# Patient Record
Sex: MALE | NOT HISPANIC OR LATINO | Employment: FULL TIME | URBAN - METROPOLITAN AREA
[De-identification: names, ages, dates, MRNs, and addresses within clinical notes are randomized per-mention and may not be internally consistent; named-entity substitution may affect disease eponyms.]

---

## 2017-11-30 DIAGNOSIS — E78.00 PURE HYPERCHOLESTEROLEMIA: ICD-10-CM

## 2017-11-30 DIAGNOSIS — I10 ESSENTIAL HYPERTENSION: ICD-10-CM

## 2017-12-01 RX ORDER — LISINOPRIL 20 MG/1
20 TABLET ORAL DAILY
Qty: 30 TAB | Refills: 0 | Status: SHIPPED | OUTPATIENT
Start: 2017-12-01 | End: 2017-12-29 | Stop reason: SDUPTHER

## 2017-12-01 RX ORDER — SIMVASTATIN 40 MG
40 TABLET ORAL EVERY EVENING
Qty: 30 TAB | Refills: 0 | Status: SHIPPED | OUTPATIENT
Start: 2017-12-01 | End: 2017-12-29 | Stop reason: SDUPTHER

## 2017-12-29 ENCOUNTER — OFFICE VISIT (OUTPATIENT)
Dept: CARDIOLOGY | Facility: MEDICAL CENTER | Age: 57
End: 2017-12-29
Payer: COMMERCIAL

## 2017-12-29 VITALS
WEIGHT: 217 LBS | OXYGEN SATURATION: 96 % | DIASTOLIC BLOOD PRESSURE: 96 MMHG | HEIGHT: 70 IN | SYSTOLIC BLOOD PRESSURE: 162 MMHG | BODY MASS INDEX: 31.07 KG/M2 | HEART RATE: 74 BPM

## 2017-12-29 DIAGNOSIS — E78.00 PURE HYPERCHOLESTEROLEMIA: ICD-10-CM

## 2017-12-29 DIAGNOSIS — I10 ESSENTIAL HYPERTENSION: ICD-10-CM

## 2017-12-29 PROCEDURE — 99213 OFFICE O/P EST LOW 20 MIN: CPT | Performed by: INTERNAL MEDICINE

## 2017-12-29 RX ORDER — LISINOPRIL 20 MG/1
20 TABLET ORAL DAILY
Qty: 90 TAB | Refills: 3 | Status: SHIPPED | OUTPATIENT
Start: 2017-12-29 | End: 2019-02-25 | Stop reason: SDUPTHER

## 2017-12-29 RX ORDER — SIMVASTATIN 40 MG
40 TABLET ORAL EVERY EVENING
Qty: 90 TAB | Refills: 3 | Status: SHIPPED | OUTPATIENT
Start: 2017-12-29

## 2017-12-29 ASSESSMENT — ENCOUNTER SYMPTOMS
EYE DISCHARGE: 0
NAUSEA: 0
FEVER: 0
HEARTBURN: 0
DEPRESSION: 0
BLURRED VISION: 0
DIZZINESS: 0
HEADACHES: 0
BRUISES/BLEEDS EASILY: 0
PND: 0
MYALGIAS: 0
NERVOUS/ANXIOUS: 0
COUGH: 0
PALPITATIONS: 0
CHILLS: 0
SHORTNESS OF BREATH: 0

## 2017-12-29 NOTE — LETTER
[facility]   [address]   [address]  [place] [address]  Phone: [phone]  Fax: [phone]              Benito Davis  1960    Encounter Date: 12/29/2017    Eduardo Rosado M.D.          PROGRESS NOTE:  Subjective:   Benito Davis is a 57 y.o. male who presents today In annual follow-up for hypertension and hyperlipidemia.  He's being evaluated for sleep apnea currently  He does not have daytime somnolence.  Home blood pressures are usually elevated initially. He waits a few minutes and all blood pressures are 130 systolic or less.  His last lipids were checked and he gave me the values which I did not record but they were satisfactory.  No other new issues    Past Medical History:   Diagnosis Date   • Heart burn    • Hypertension    • Indigestion    • Renal disorder     stones     Past Surgical History:   Procedure Laterality Date   • CYSTOSCOPY  10/2/2015    Procedure: CYSTOSCOPY ;  Surgeon: Zaki Fitzgerald M.D.;  Location: [facility];  Service:    • URETEROSCOPY Right 10/2/2015    Procedure: URETEROSCOPY;  Surgeon: Zaki Fitzgerald M.D.;  Location: [facility];  Service:    • LASERTRIPSY  10/2/2015    Procedure:  LASER LITHOTRIPSY;  Surgeon: Zaki Fitzgerald M.D.;  Location: [facility];  Service:    • STENT PLACEMENT Left 10/2/2015    Procedure: STENT PLACEMENT;  Surgeon: Zaki Fitzgerald M.D.;  Location: [facility];  Service:    • OTHER  2012    ganglion cyst removal Left wrist   • OTHER ABDOMINAL SURGERY  1990    appendectomy     History reviewed. No pertinent family history.  History   Smoking Status   • Former Smoker   • Packs/day: 0.50   • Years: 4.00   • Types: Cigarettes   • Quit date: 10/20/1983   Smokeless Tobacco   • Never Used     Allergies   Allergen Reactions   • Codeine Rash     Outpatient Encounter Prescriptions as of 12/29/2017   Medication Sig Dispense  "Refill   • simvastatin (ZOCOR) 40 MG Tab Take 1 Tab by mouth every evening. 90 Tab 3   • lisinopril (PRINIVIL) 20 MG Tab Take 1 Tab by mouth every day. 90 Tab 3   • tamsulosin (FLOMAX) 0.4 MG capsule Take 0.4 mg by mouth ONE-HALF HOUR AFTER BREAKFAST.     • aspirin EC (ECOTRIN) 81 MG Tablet Delayed Response Take 81 mg by mouth every day.     • ranitidine (ZANTAC) 75 MG tablet Take 75 mg by mouth every evening.     • [DISCONTINUED] simvastatin (ZOCOR) 40 MG Tab Take 1 Tab by mouth every evening. Needs to be seen for further refills. Thank you 30 Tab 0   • [DISCONTINUED] lisinopril (PRINIVIL) 20 MG Tab Take 1 Tab by mouth every day. Needs to be seen for further refills. Thank you 30 Tab 0   • [DISCONTINUED] hydrocodone-acetaminophen (NORCO) 5-325 MG Tab per tablet Take 1-2 Tabs by mouth every four hours as needed. (Patient not taking: Reported on 12/29/2017) 20 Tab 0   • [DISCONTINUED] oxycodone-acetaminophen (PERCOCET) 5-325 MG Tab Take 1-2 Tabs by mouth every four hours as needed.     • [DISCONTINUED] GARLIC PO Take 1,000 mg by mouth every day.       No facility-administered encounter medications on file as of 12/29/2017.      Review of Systems   Constitutional: Negative for chills, fever and malaise/fatigue.   Eyes: Negative for blurred vision and discharge.   Respiratory: Negative for cough and shortness of breath.    Cardiovascular: Negative for chest pain, palpitations, leg swelling and PND.   Gastrointestinal: Negative for heartburn and nausea.   Genitourinary: Negative for dysuria and urgency.   Musculoskeletal: Positive for joint pain. Negative for myalgias.   Skin: Negative for itching and rash.   Neurological: Negative for dizziness and headaches.   Endo/Heme/Allergies: Negative for environmental allergies. Does not bruise/bleed easily.   Psychiatric/Behavioral: Negative for depression. The patient is not nervous/anxious.         Objective:   BP (!) 162/96   Pulse 74   Ht 1.778 m (5' 10\")   Wt 98.4 kg " (217 lb)   SpO2 96%   BMI 31.14 kg/m²      Physical Exam   Constitutional: He is oriented to person, place, and time. He appears well-developed and well-nourished.   HENT:   Head: Normocephalic and atraumatic.   Eyes: Conjunctivae and EOM are normal. No scleral icterus.   Neck: Neck supple. No JVD present. No thyromegaly present.   Cardiovascular: Normal rate, regular rhythm and normal heart sounds.  Exam reveals no gallop and no friction rub.    No murmur heard.  Pulmonary/Chest: Effort normal and breath sounds normal. No respiratory distress. He has no wheezes. He has no rales. He exhibits no tenderness.   Abdominal: Soft. Bowel sounds are normal. He exhibits no distension and no mass. There is no tenderness.   Neurological: He is alert and oriented to person, place, and time. Coordination normal.   Skin: Skin is warm and dry. No rash noted. No pallor.   Psychiatric: He has a normal mood and affect. His behavior is normal. Judgment and thought content normal.       Assessment:     1. Pure hypercholesterolemia  simvastatin (ZOCOR) 40 MG Tab   2. Essential hypertension  lisinopril (PRINIVIL) 20 MG Tab       Medical Decision Making:  Today's Assessment / Status / Plan:   Clinical status stable.  White count syndrome documented.  Blood pressure high normal with current meds.  No change in meds.  Return annually      Tata Charles A.P.R.N.  [address] [facility] [address]  VIA Facsimile: [phone]

## 2017-12-30 NOTE — PROGRESS NOTES
Subjective:   Benito Davis is a 57 y.o. male who presents today In annual follow-up for hypertension and hyperlipidemia.  He's being evaluated for sleep apnea currently  He does not have daytime somnolence.  Home blood pressures are usually elevated initially. He waits a few minutes and all blood pressures are 130 systolic or less.  His last lipids were checked and he gave me the values which I did not record but they were satisfactory.  No other new issues    Past Medical History:   Diagnosis Date   • Heart burn    • Hypertension    • Indigestion    • Renal disorder     stones     Past Surgical History:   Procedure Laterality Date   • CYSTOSCOPY  10/2/2015    Procedure: CYSTOSCOPY ;  Surgeon: Zaki Fitzgerald M.D.;  Location: [facility];  Service:    • URETEROSCOPY Right 10/2/2015    Procedure: URETEROSCOPY;  Surgeon: Zaki Fitzgerald M.D.;  Location: [facility];  Service:    • LASERTRIPSY  10/2/2015    Procedure:  LASER LITHOTRIPSY;  Surgeon: Zaki Fitzgerald M.D.;  Location: SUR[facility]ERY [facility];  Service:    • STENT PLACEMENT Left 10/2/2015    Procedure: STENT PLACEMENT;  Surgeon: Zaki Fitzgerald M.D.;  Location: SUR[facility]ERY [facility];  Service:    • OTHER  2012    ganglion cyst removal Left wrist   • OTHER ABDOMINAL SURGERY  1990    appendectomy     History reviewed. No pertinent family history.  History   Smoking Status   • Former Smoker   • Packs/day: 0.50   • Years: 4.00   • Types: Cigarettes   • Quit date: 10/20/1983   Smokeless Tobacco   • Never Used     Allergies   Allergen Reactions   • Codeine Rash     Outpatient Encounter Prescriptions as of 12/29/2017   Medication Sig Dispense Refill   • simvastatin (ZOCOR) 40 MG Tab Take 1 Tab by mouth every evening. 90 Tab 3   • lisinopril (PRINIVIL) 20 MG Tab Take 1 Tab by mouth every day. 90 Tab 3   • tamsulosin (FLOMAX) 0.4 MG capsule Take 0.4 mg by mouth ONE-HALF HOUR AFTER BREAKFAST.     • aspirin EC (ECOTRIN) 81 MG Tablet  "Delayed Response Take 81 mg by mouth every day.     • ranitidine (ZANTAC) 75 MG tablet Take 75 mg by mouth every evening.     • [DISCONTINUED] simvastatin (ZOCOR) 40 MG Tab Take 1 Tab by mouth every evening. Needs to be seen for further refills. Thank you 30 Tab 0   • [DISCONTINUED] lisinopril (PRINIVIL) 20 MG Tab Take 1 Tab by mouth every day. Needs to be seen for further refills. Thank you 30 Tab 0   • [DISCONTINUED] hydrocodone-acetaminophen (NORCO) 5-325 MG Tab per tablet Take 1-2 Tabs by mouth every four hours as needed. (Patient not taking: Reported on 12/29/2017) 20 Tab 0   • [DISCONTINUED] oxycodone-acetaminophen (PERCOCET) 5-325 MG Tab Take 1-2 Tabs by mouth every four hours as needed.     • [DISCONTINUED] GARLIC PO Take 1,000 mg by mouth every day.       No facility-administered encounter medications on file as of 12/29/2017.      Review of Systems   Constitutional: Negative for chills, fever and malaise/fatigue.   Eyes: Negative for blurred vision and discharge.   Respiratory: Negative for cough and shortness of breath.    Cardiovascular: Negative for chest pain, palpitations, leg swelling and PND.   Gastrointestinal: Negative for heartburn and nausea.   Genitourinary: Negative for dysuria and urgency.   Musculoskeletal: Positive for joint pain. Negative for myalgias.   Skin: Negative for itching and rash.   Neurological: Negative for dizziness and headaches.   Endo/Heme/Allergies: Negative for environmental allergies. Does not bruise/bleed easily.   Psychiatric/Behavioral: Negative for depression. The patient is not nervous/anxious.         Objective:   BP (!) 162/96   Pulse 74   Ht 1.778 m (5' 10\")   Wt 98.4 kg (217 lb)   SpO2 96%   BMI 31.14 kg/m²     Physical Exam   Constitutional: He is oriented to person, place, and time. He appears well-developed and well-nourished.   HENT:   Head: Normocephalic and atraumatic.   Eyes: Conjunctivae and EOM are normal. No scleral icterus.   Neck: Neck supple. " No JVD present. No thyromegaly present.   Cardiovascular: Normal rate, regular rhythm and normal heart sounds.  Exam reveals no gallop and no friction rub.    No murmur heard.  Pulmonary/Chest: Effort normal and breath sounds normal. No respiratory distress. He has no wheezes. He has no rales. He exhibits no tenderness.   Abdominal: Soft. Bowel sounds are normal. He exhibits no distension and no mass. There is no tenderness.   Neurological: He is alert and oriented to person, place, and time. Coordination normal.   Skin: Skin is warm and dry. No rash noted. No pallor.   Psychiatric: He has a normal mood and affect. His behavior is normal. Judgment and thought content normal.       Assessment:     1. Pure hypercholesterolemia  simvastatin (ZOCOR) 40 MG Tab   2. Essential hypertension  lisinopril (PRINIVIL) 20 MG Tab       Medical Decision Making:  Today's Assessment / Status / Plan:   Clinical status stable.  White count syndrome documented.  Blood pressure high normal with current meds.  No change in meds.  Return annually

## 2018-01-19 ENCOUNTER — HOSPITAL ENCOUNTER (OUTPATIENT)
Dept: HOSPITAL 8 - CFH | Age: 58
Discharge: HOME | End: 2018-01-19
Attending: NURSE PRACTITIONER
Payer: COMMERCIAL

## 2018-01-19 DIAGNOSIS — I10: ICD-10-CM

## 2018-01-19 DIAGNOSIS — I07.1: Primary | ICD-10-CM

## 2018-01-19 DIAGNOSIS — E78.5: ICD-10-CM

## 2018-01-19 DIAGNOSIS — Z82.49: ICD-10-CM

## 2018-01-19 PROCEDURE — 93306 TTE W/DOPPLER COMPLETE: CPT

## 2018-07-26 ENCOUNTER — OFFICE VISIT (OUTPATIENT)
Dept: CARDIOLOGY | Facility: MEDICAL CENTER | Age: 58
End: 2018-07-26
Payer: COMMERCIAL

## 2018-07-26 VITALS
HEIGHT: 70 IN | WEIGHT: 216 LBS | RESPIRATION RATE: 14 BRPM | SYSTOLIC BLOOD PRESSURE: 140 MMHG | OXYGEN SATURATION: 96 % | DIASTOLIC BLOOD PRESSURE: 90 MMHG | BODY MASS INDEX: 30.92 KG/M2 | HEART RATE: 64 BPM

## 2018-07-26 DIAGNOSIS — R94.6 ABNORMAL THYROID FUNCTION TEST: ICD-10-CM

## 2018-07-26 DIAGNOSIS — R00.1 BRADYCARDIA: ICD-10-CM

## 2018-07-26 DIAGNOSIS — I10 ESSENTIAL HYPERTENSION: ICD-10-CM

## 2018-07-26 DIAGNOSIS — E78.5 DYSLIPIDEMIA: ICD-10-CM

## 2018-07-26 PROCEDURE — 99204 OFFICE O/P NEW MOD 45 MIN: CPT | Performed by: INTERNAL MEDICINE

## 2018-07-26 ASSESSMENT — ENCOUNTER SYMPTOMS
PALPITATIONS: 0
HEARTBURN: 1
PND: 0
CHILLS: 0
SHORTNESS OF BREATH: 0
FEVER: 0
ABDOMINAL PAIN: 0
DIZZINESS: 0
LOSS OF CONSCIOUSNESS: 0
BLURRED VISION: 0
ORTHOPNEA: 0
MYALGIAS: 0
INSOMNIA: 0

## 2018-07-26 NOTE — LETTER
[facility]   [address] [place] [address]  [place] [address]  Phone: [phone]  Fax: [phone]              Benito Davis  1960    Encounter Date: 2018    Merlin Gomez M.D.          PROGRESS NOTE:  Chief Complaint   Patient presents with   •  Bradycardia       Subjective:   Benito Davis is a 58 y.o. male who presents today referred by his PCP Tata HERRERA for evaluation of bradycardia.    The patient has significant past medical history of hypertension, hyperlipidemia and family history of premature heart disease.    The patient has been evaluated by [facility].  The patient was informed by his girlfriend that he was a snorer and had restless sleep.  He was seen by his PCP who recommended a sleep study.  In  the patient underwent a sleep study evaluation at which time he was noted to have nocturnal hypoxemia with oxygen saturations in the 80s in addition to sinus bradycardia with rates of 31-59.  Apparently there was no evidence of obstructive sleep apnea.  Pulmonary function tests were normal.  He was only just started on nocturnal oxygen supplements 3 days ago.  He also had an echocardiogram at [facility] that showed normal left ventricular ejection fraction of 65% with mild pulmonary hypertension with pressure of 39 mmHg.  The patient is yet to see a pulmonologist at [facility].    The patient has no cardiac symptoms.  He exercises regularly without any symptoms of chest pain or shortness of breath.  The patient has no symptoms of lightheadedness, palpitations or syncope.    The patient had previously been seen by my partner Dr. Eduardo Rosado in  and underwent a stress echocardiogram that was normal.    The patient has been on chronic aspirin therapy.  Family history significant that his father  suddenly of a heart attack at age 49 and had a half brother who had CABG at age  46.    Social history surgical tech at [employer].  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    Past Medical History:   Diagnosis Date   • Heart burn    • Hypertension    • Indigestion    • Renal disorder     stones     Past Surgical History:   Procedure Laterality Date   • CYSTOSCOPY  10/2/2015    Procedure: CYSTOSCOPY ;  Surgeon: Zaki Fitzgerald M.D.;  Location: SURGERY [facility];  Service:    • URETEROSCOPY Right 10/2/2015    Procedure: URETEROSCOPY;  Surgeon: Zaki Fitzgerald M.D.;  Location: SURGERY [facility];  Service:    • LASERTRIPSY  10/2/2015    Procedure:  LASER LITHOTRIPSY;  Surgeon: Zaki Fitzgerald M.D.;  Location: SUR[facility]ERY [facility];  Service:    • STENT PLACEMENT Left 10/2/2015    Procedure: STENT PLACEMENT;  Surgeon: Zaki Fitzgerald M.D.;  Location: SURGERY [facility];  Service:    • OTHER  2012    ganglion cyst removal Left wrist   • OTHER ABDOMINAL SURGERY  1990    appendectomy     History reviewed. No pertinent family history.  Social History     Social History   • Marital status: Single     Spouse name: N/A   • Number of children: N/A   • Years of education: N/A     Occupational History   • Not on file.     Social History Main Topics   • Smoking status: Former Smoker     Packs/day: 0.50     Years: 4.00     Types: Cigarettes     Quit date: 10/20/1983   • Smokeless tobacco: Never Used   • Alcohol use Yes      Comment: six pack a month   • Drug use: No   • Sexual activity: Not on file     Other Topics Concern   • Not on file     Social History Narrative   • No narrative on file     Allergies   Allergen Reactions   • Codeine Rash     Outpatient Encounter Prescriptions as of 7/26/2018   Medication Sig Dispense Refill   • simvastatin (ZOCOR) 40 MG Tab Take 1 Tab by mouth every evening. 90 Tab 3   • lisinopril (PRINIVIL) 20 MG Tab Take 1 Tab by mouth every day. 90 Tab 3   • tamsulosin (FLOMAX) 0.4 MG capsule Take 0.4 mg by mouth as needed.     • aspirin EC (ECOTRIN) 81 MG Tablet  "Delayed Response Take 81 mg by mouth every day.     • ranitidine (ZANTAC) 75 MG tablet Take 35 mg by mouth every evening.       No facility-administered encounter medications on file as of 7/26/2018.      Review of Systems   Constitutional: Negative for chills and fever.   HENT: Positive for tinnitus. Negative for congestion.    Eyes: Negative for blurred vision.   Respiratory: Negative for shortness of breath.    Cardiovascular: Negative for chest pain, palpitations, orthopnea, leg swelling and PND.   Gastrointestinal: Positive for heartburn. Negative for abdominal pain.   Genitourinary: Negative for dysuria.   Musculoskeletal: Negative for joint pain and myalgias.   Skin: Negative for rash.   Neurological: Negative for dizziness and loss of consciousness.   Psychiatric/Behavioral: The patient does not have insomnia.         Objective:   /90   Pulse 64   Resp 14   Ht 1.778 m (5' 10\")   Wt 98 kg (216 lb)   SpO2 96%   BMI 30.99 kg/m²      Physical Exam   Constitutional: He is oriented to person, place, and time. He appears well-developed and well-nourished.   HENT:   Head: Normocephalic and atraumatic.   Eyes: Pupils are equal, round, and reactive to light. EOM are normal. No scleral icterus.   Neck: Neck supple. No JVD present. No thyromegaly present.   Cardiovascular: Normal rate, regular rhythm, normal heart sounds and intact distal pulses.  Exam reveals no gallop and no friction rub.    No murmur heard.  Pulmonary/Chest: Effort normal and breath sounds normal. No respiratory distress. He has no wheezes. He has no rales.   Abdominal: Soft. Bowel sounds are normal. He exhibits no mass. There is no tenderness.   Musculoskeletal: Normal range of motion. He exhibits no edema or deformity.   Lymphadenopathy:     He has no cervical adenopathy.   Neurological: He is alert and oriented to person, place, and time. No cranial nerve deficit. He exhibits normal muscle tone.   Skin: Skin is warm and dry.   "   Psychiatric: He has a normal mood and affect. His behavior is normal.     All of his recent diagnostic tests including his echocardiogram previous EKG from 2015 and stress echocardiogram were reviewed by myself.    STRESS ECHOCARDIOGRAM 10/30/2014  Excellent exercise tolerance.  LVH on baseline echo.  Hypertensive BP response at peak exercise.  Negative for ischemia by EKG and Echo.    Assessment:     1. Bradycardia     2. Dyslipidemia  CT-CARDIAC SCORING   3. Essential hypertension         Medical Decision Making:  Today's Assessment / Status / Plan:     1.  I reviewed with patient his current cardiac condition.  2.  I indicated that nocturnal bradycardia particularly associated with hypoxemia as normal and that he is not a candidate for pacemaker.  3.  I think his risk for coronary disease should be further evaluated with a CT coronary calcification scan to see if he requires more intense lipid-lowering regimen.  4.  I will make further recommendations based on the above evaluation.  5.  Will await further recommendation from [facility] pulmonary group.    Thank you for allowing me see this gentleman in consultation      ALEXANDRIA ChuRRadhaN.  [address] [facility] [address]  VIA Facsimile: [phone]

## 2018-07-26 NOTE — PROGRESS NOTES
Chief Complaint   Patient presents with   •  Bradycardia       Subjective:   Benito Davis is a 58 y.o. male who presents today referred by his PCP Tata HERRERA for evaluation of bradycardia.    The patient has significant past medical history of hypertension, hyperlipidemia and family history of premature heart disease.    The patient has been evaluated by [facility].  The patient was informed by his girlfriend that he was a snorer and had restless sleep.  He was seen by his PCP who recommended a sleep study.  In  the patient underwent a sleep study evaluation at which time he was noted to have nocturnal hypoxemia with oxygen saturations in the 80s in addition to sinus bradycardia with rates of 31-59.  Apparently there was no evidence of obstructive sleep apnea.  Pulmonary function tests were normal.  He was only just started on nocturnal oxygen supplements 3 days ago.  He also had an echocardiogram at [facility] that showed normal left ventricular ejection fraction of 65% with mild pulmonary hypertension with pressure of 39 mmHg.  The patient is yet to see a pulmonologist at [facility].    The patient has no cardiac symptoms.  He exercises regularly without any symptoms of chest pain or shortness of breath.  The patient has no symptoms of lightheadedness, palpitations or syncope.    The patient had previously been seen by my partner Dr. Eduardo Rosado in  and underwent a stress echocardiogram that was normal.    The patient has been on chronic aspirin therapy.  Family history significant that his father  suddenly of a heart attack at age 49 and had a half brother who had CABG at age 46.    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    Past Medical History:   Diagnosis Date   • Heart burn    • Hypertension    • Indigestion    • Renal disorder     stones     Past Surgical History:   Procedure Laterality Date    • CYSTOSCOPY  10/2/2015    Procedure: CYSTOSCOPY ;  Surgeon: Zaki Fitzgerald M.D.;  Location: SURGERY [facility];  Service:    • URETEROSCOPY Right 10/2/2015    Procedure: URETEROSCOPY;  Surgeon: Zaki Fitzgerald M.D.;  Location: SURGERY [facility];  Service:    • LASERTRIPSY  10/2/2015    Procedure:  LASER LITHOTRIPSY;  Surgeon: Zaki Fitzgerald M.D.;  Location: SURGERY [facility];  Service:    • STENT PLACEMENT Left 10/2/2015    Procedure: STENT PLACEMENT;  Surgeon: Zaki Fitzgerald M.D.;  Location: SURGERY [facility];  Service:    • OTHER  2012    ganglion cyst removal Left wrist   • OTHER ABDOMINAL SURGERY  1990    appendectomy     History reviewed. No pertinent family history.  Social History     Social History   • Marital status: Single     Spouse name: N/A   • Number of children: N/A   • Years of education: N/A     Occupational History   • Not on file.     Social History Main Topics   • Smoking status: Former Smoker     Packs/day: 0.50     Years: 4.00     Types: Cigarettes     Quit date: 10/20/1983   • Smokeless tobacco: Never Used   • Alcohol use Yes      Comment: six pack a month   • Drug use: No   • Sexual activity: Not on file     Other Topics Concern   • Not on file     Social History Narrative   • No narrative on file     Allergies   Allergen Reactions   • Codeine Rash     Outpatient Encounter Prescriptions as of 7/26/2018   Medication Sig Dispense Refill   • simvastatin (ZOCOR) 40 MG Tab Take 1 Tab by mouth every evening. 90 Tab 3   • lisinopril (PRINIVIL) 20 MG Tab Take 1 Tab by mouth every day. 90 Tab 3   • tamsulosin (FLOMAX) 0.4 MG capsule Take 0.4 mg by mouth as needed.     • aspirin EC (ECOTRIN) 81 MG Tablet Delayed Response Take 81 mg by mouth every day.     • ranitidine (ZANTAC) 75 MG tablet Take 35 mg by mouth every evening.       No facility-administered encounter medications on file as of 7/26/2018.      Review of Systems   Constitutional: Negative for chills and fever.  "  HENT: Positive for tinnitus. Negative for congestion.    Eyes: Negative for blurred vision.   Respiratory: Negative for shortness of breath.    Cardiovascular: Negative for chest pain, palpitations, orthopnea, leg swelling and PND.   Gastrointestinal: Positive for heartburn. Negative for abdominal pain.   Genitourinary: Negative for dysuria.   Musculoskeletal: Negative for joint pain and myalgias.   Skin: Negative for rash.   Neurological: Negative for dizziness and loss of consciousness.   Psychiatric/Behavioral: The patient does not have insomnia.         Objective:   /90   Pulse 64   Resp 14   Ht 1.778 m (5' 10\")   Wt 98 kg (216 lb)   SpO2 96%   BMI 30.99 kg/m²     Physical Exam   Constitutional: He is oriented to person, place, and time. He appears well-developed and well-nourished.   HENT:   Head: Normocephalic and atraumatic.   Eyes: Pupils are equal, round, and reactive to light. EOM are normal. No scleral icterus.   Neck: Neck supple. No JVD present. No thyromegaly present.   Cardiovascular: Normal rate, regular rhythm, normal heart sounds and intact distal pulses.  Exam reveals no gallop and no friction rub.    No murmur heard.  Pulmonary/Chest: Effort normal and breath sounds normal. No respiratory distress. He has no wheezes. He has no rales.   Abdominal: Soft. Bowel sounds are normal. He exhibits no mass. There is no tenderness.   Musculoskeletal: Normal range of motion. He exhibits no edema or deformity.   Lymphadenopathy:     He has no cervical adenopathy.   Neurological: He is alert and oriented to person, place, and time. No cranial nerve deficit. He exhibits normal muscle tone.   Skin: Skin is warm and dry.   Psychiatric: He has a normal mood and affect. His behavior is normal.     All of his recent diagnostic tests including his echocardiogram previous EKG from 2015 and stress echocardiogram were reviewed by myself.    Laboratory from 10/10/2017 showed abnormal thyroid function tests " with low TSH and elevated T4.    Blood pressure log was reviewed which showed normal blood pressure measurements.    STRESS ECHOCARDIOGRAM 10/30/2014  Excellent exercise tolerance.  LVH on baseline echo.  Hypertensive BP response at peak exercise.  Negative for ischemia by EKG and Echo.    Assessment:     1. Bradycardia     2. Dyslipidemia  CT-CARDIAC SCORING   3. Essential hypertension     4. Abnormal thyroid function test       Medical Decision Making:  Today's Assessment / Status / Plan:     1.  I reviewed with patient his current cardiac condition.  2.  I indicated that nocturnal bradycardia particularly associated with hypoxemia as normal and that he is not a candidate for pacemaker.  3.  I think his risk for coronary disease should be further evaluated with a CT coronary calcification scan to see if he requires more intense lipid-lowering regimen.  4.  I will make further recommendations based on the above evaluation.  5.  The patient will follow up with his PCP about his abnormal thyroid function tests which I reviewed with him.  6.  Will await further recommendation from [facility] pulmonary group.    Thank you for allowing me see this gentleman in consultation

## 2018-07-26 NOTE — LETTER
[facility]   [address] [place] [address]  TY[place] [address]  Phone: [phone]  Fax: [phone]              Benito Davis  1960    Encounter Date: 2018    Merlin Gomez M.D.          PROGRESS NOTE:  Chief Complaint   Patient presents with   •  Bradycardia       Subjective:   Benito Davis is a 58 y.o. male who presents today referred by his PCP Tata HERRERA for evaluation of bradycardia.    The patient has significant past medical history of hypertension, hyperlipidemia and family history of premature heart disease.    The patient has been evaluated by [facility].  The patient was informed by his girlfriend that he was a snorer and had restless sleep.  He was seen by his PCP who recommended a sleep study.  In  the patient underwent a sleep study evaluation at which time he was noted to have nocturnal hypoxemia with oxygen saturations in the 80s in addition to sinus bradycardia with rates of 31-59.  Apparently there was no evidence of obstructive sleep apnea.  Pulmonary function tests were normal.  He was only just started on nocturnal oxygen supplements 3 days ago.  He also had an echocardiogram at [facility] that showed normal left ventricular ejection fraction of 65% with mild pulmonary hypertension with pressure of 39 mmHg.  The patient is yet to see a pulmonologist at [facility].    The patient has no cardiac symptoms.  He exercises regularly without any symptoms of chest pain or shortness of breath.  The patient has no symptoms of lightheadedness, palpitations or syncope.    The patient had previously been seen by my partner Dr. Eduardo Rosado in  and underwent a stress echocardiogram that was normal.    The patient has been on chronic aspirin therapy.  Family history significant that his father  suddenly of a heart attack at age 49 and had a half brother who had CABG at age  46.    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    Past Medical History:   Diagnosis Date   • Heart burn    • Hypertension    • Indigestion    • Renal disorder     stones     Past Surgical History:   Procedure Laterality Date   • CYSTOSCOPY  10/2/2015    Procedure: CYSTOSCOPY ;  Surgeon: Zaki Fitzgerald M.D.;  Location: SURGERY [facility];  Service:    • URETEROSCOPY Right 10/2/2015    Procedure: URETEROSCOPY;  Surgeon: Zaki Fitzgerald M.D.;  Location: SURGERY [facility];  Service:    • LASERTRIPSY  10/2/2015    Procedure:  LASER LITHOTRIPSY;  Surgeon: Zaki Fitzgerald M.D.;  Location: SURGERY [facility];  Service:    • STENT PLACEMENT Left 10/2/2015    Procedure: STENT PLACEMENT;  Surgeon: Zaki Fitzgerald M.D.;  Location: SURGERY [facility];  Service:    • OTHER  2012    ganglion cyst removal Left wrist   • OTHER ABDOMINAL SURGERY  1990    appendectomy     History reviewed. No pertinent family history.  Social History     Social History   • Marital status: Single     Spouse name: N/A   • Number of children: N/A   • Years of education: N/A     Occupational History   • Not on file.     Social History Main Topics   • Smoking status: Former Smoker     Packs/day: 0.50     Years: 4.00     Types: Cigarettes     Quit date: 10/20/1983   • Smokeless tobacco: Never Used   • Alcohol use Yes      Comment: six pack a month   • Drug use: No   • Sexual activity: Not on file     Other Topics Concern   • Not on file     Social History Narrative   • No narrative on file     Allergies   Allergen Reactions   • Codeine Rash     Outpatient Encounter Prescriptions as of 7/26/2018   Medication Sig Dispense Refill   • simvastatin (ZOCOR) 40 MG Tab Take 1 Tab by mouth every evening. 90 Tab 3   • lisinopril (PRINIVIL) 20 MG Tab Take 1 Tab by mouth every day. 90 Tab 3   • tamsulosin (FLOMAX) 0.4 MG capsule Take 0.4 mg by mouth as needed.     • aspirin EC (ECOTRIN) 81 MG Tablet  "Delayed Response Take 81 mg by mouth every day.     • ranitidine (ZANTAC) 75 MG tablet Take 35 mg by mouth every evening.       No facility-administered encounter medications on file as of 7/26/2018.      Review of Systems   Constitutional: Negative for chills and fever.   HENT: Positive for tinnitus. Negative for congestion.    Eyes: Negative for blurred vision.   Respiratory: Negative for shortness of breath.    Cardiovascular: Negative for chest pain, palpitations, orthopnea, leg swelling and PND.   Gastrointestinal: Positive for heartburn. Negative for abdominal pain.   Genitourinary: Negative for dysuria.   Musculoskeletal: Negative for joint pain and myalgias.   Skin: Negative for rash.   Neurological: Negative for dizziness and loss of consciousness.   Psychiatric/Behavioral: The patient does not have insomnia.         Objective:   /90   Pulse 64   Resp 14   Ht 1.778 m (5' 10\")   Wt 98 kg (216 lb)   SpO2 96%   BMI 30.99 kg/m²      Physical Exam   Constitutional: He is oriented to person, place, and time. He appears well-developed and well-nourished.   HENT:   Head: Normocephalic and atraumatic.   Eyes: Pupils are equal, round, and reactive to light. EOM are normal. No scleral icterus.   Neck: Neck supple. No JVD present. No thyromegaly present.   Cardiovascular: Normal rate, regular rhythm, normal heart sounds and intact distal pulses.  Exam reveals no gallop and no friction rub.    No murmur heard.  Pulmonary/Chest: Effort normal and breath sounds normal. No respiratory distress. He has no wheezes. He has no rales.   Abdominal: Soft. Bowel sounds are normal. He exhibits no mass. There is no tenderness.   Musculoskeletal: Normal range of motion. He exhibits no edema or deformity.   Lymphadenopathy:     He has no cervical adenopathy.   Neurological: He is alert and oriented to person, place, and time. No cranial nerve deficit. He exhibits normal muscle tone.   Skin: Skin is warm and dry.   "   Psychiatric: He has a normal mood and affect. His behavior is normal.     All of his recent diagnostic tests including his echocardiogram previous EKG from 2015 and stress echocardiogram were reviewed by myself.    Laboratory from 10/10/2017 showed abnormal thyroid function tests with low TSH and elevated T4.    Blood pressure log was reviewed which showed normal blood pressure measurements.    STRESS ECHOCARDIOGRAM 10/30/2014  Excellent exercise tolerance.  LVH on baseline echo.  Hypertensive BP response at peak exercise.  Negative for ischemia by EKG and Echo.    Assessment:     1. Bradycardia     2. Dyslipidemia  CT-CARDIAC SCORING   3. Essential hypertension     4. Abnormal thyroid function test       Medical Decision Making:  Today's Assessment / Status / Plan:     1.  I reviewed with patient his current cardiac condition.  2.  I indicated that nocturnal bradycardia particularly associated with hypoxemia as normal and that he is not a candidate for pacemaker.  3.  I think his risk for coronary disease should be further evaluated with a CT coronary calcification scan to see if he requires more intense lipid-lowering regimen.  4.  I will make further recommendations based on the above evaluation.  5.  The patient will follow up with his PCP about his abnormal thyroid function tests which I reviewed with him.  6.  Will await further recommendation from [facility] pulmonary group.    Thank you for allowing me see this gentleman in consultation      ALEXANDRIA ChuR.N.  [id number] [facility] [address]  VIA Facsimile: [phone]

## 2018-07-26 NOTE — LETTER
[facility]   [address] [place] [address]  [place] [address]  Phone: [phone]  Fax: [phone]              Benito Davis  1960    Encounter Date: 2018    Merlin Gomez M.D.          PROGRESS NOTE:  Chief Complaint   Patient presents with   •  Bradycardia       Subjective:   Benito Davis is a 58 y.o. male who presents today referred by his PCP Tata HERRERA for evaluation of bradycardia.    The patient has significant past medical history of hypertension, hyperlipidemia and family history of premature heart disease.    The patient has been evaluated by [facility].  The patient was informed by his girlfriend that he was a snorer and had restless sleep.  He was seen by his PCP who recommended a sleep study.  In  the patient underwent a sleep study evaluation at which time he was noted to have nocturnal hypoxemia with oxygen saturations in the 80s in addition to sinus bradycardia with rates of 31-59.  Apparently there was no evidence of obstructive sleep apnea.  Pulmonary function tests were normal.  He was only just started on nocturnal oxygen supplements 3 days ago.  He also had an echocardiogram at [facility] that showed normal left ventricular ejection fraction of 65% with mild pulmonary hypertension with pressure of 39 mmHg.  The patient is yet to see a pulmonologist at [facility].    The patient has no cardiac symptoms.  He exercises regularly without any symptoms of chest pain or shortness of breath.  The patient has no symptoms of lightheadedness, palpitations or syncope.    The patient had previously been seen by my partner Dr. Eduardo Rosado in  and underwent a stress echocardiogram that was normal.    The patient has been on chronic aspirin therapy.  Family history significant that his father  suddenly of a heart attack at age 49 and had a half brother who had CABG at age  46.    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    Past Medical History:   Diagnosis Date   • Heart burn    • Hypertension    • Indigestion    • Renal disorder     stones     Past Surgical History:   Procedure Laterality Date   • CYSTOSCOPY  10/2/2015    Procedure: CYSTOSCOPY ;  Surgeon: Zaki Fitzgerald M.D.;  Location: SURGERY [facility];  Service:    • URETEROSCOPY Right 10/2/2015    Procedure: URETEROSCOPY;  Surgeon: Zaki Fitzgerald M.D.;  Location: SURGERY [facility];  Service:    • LASERTRIPSY  10/2/2015    Procedure:  LASER LITHOTRIPSY;  Surgeon: Zaki Fitzgerald M.D.;  Location: SURGERY [facility];  Service:    • STENT PLACEMENT Left 10/2/2015    Procedure: STENT PLACEMENT;  Surgeon: Zaki Fitzgerald M.D.;  Location: SURGERY [facility];  Service:    • OTHER  2012    ganglion cyst removal Left wrist   • OTHER ABDOMINAL SURGERY  1990    appendectomy     History reviewed. No pertinent family history.  Social History     Social History   • Marital status: Single     Spouse name: N/A   • Number of children: N/A   • Years of education: N/A     Occupational History   • Not on file.     Social History Main Topics   • Smoking status: Former Smoker     Packs/day: 0.50     Years: 4.00     Types: Cigarettes     Quit date: 10/20/1983   • Smokeless tobacco: Never Used   • Alcohol use Yes      Comment: six pack a month   • Drug use: No   • Sexual activity: Not on file     Other Topics Concern   • Not on file     Social History Narrative   • No narrative on file     Allergies   Allergen Reactions   • Codeine Rash     Outpatient Encounter Prescriptions as of 7/26/2018   Medication Sig Dispense Refill   • simvastatin (ZOCOR) 40 MG Tab Take 1 Tab by mouth every evening. 90 Tab 3   • lisinopril (PRINIVIL) 20 MG Tab Take 1 Tab by mouth every day. 90 Tab 3   • tamsulosin (FLOMAX) 0.4 MG capsule Take 0.4 mg by mouth as needed.     • aspirin EC (ECOTRIN) 81 MG Tablet  "Delayed Response Take 81 mg by mouth every day.     • ranitidine (ZANTAC) 75 MG tablet Take 35 mg by mouth every evening.       No facility-administered encounter medications on file as of 7/26/2018.      Review of Systems   Constitutional: Negative for chills and fever.   HENT: Positive for tinnitus. Negative for congestion.    Eyes: Negative for blurred vision.   Respiratory: Negative for shortness of breath.    Cardiovascular: Negative for chest pain, palpitations, orthopnea, leg swelling and PND.   Gastrointestinal: Positive for heartburn. Negative for abdominal pain.   Genitourinary: Negative for dysuria.   Musculoskeletal: Negative for joint pain and myalgias.   Skin: Negative for rash.   Neurological: Negative for dizziness and loss of consciousness.   Psychiatric/Behavioral: The patient does not have insomnia.         Objective:   /90   Pulse 64   Resp 14   Ht 1.778 m (5' 10\")   Wt 98 kg (216 lb)   SpO2 96%   BMI 30.99 kg/m²      Physical Exam   Constitutional: He is oriented to person, place, and time. He appears well-developed and well-nourished.   HENT:   Head: Normocephalic and atraumatic.   Eyes: Pupils are equal, round, and reactive to light. EOM are normal. No scleral icterus.   Neck: Neck supple. No JVD present. No thyromegaly present.   Cardiovascular: Normal rate, regular rhythm, normal heart sounds and intact distal pulses.  Exam reveals no gallop and no friction rub.    No murmur heard.  Pulmonary/Chest: Effort normal and breath sounds normal. No respiratory distress. He has no wheezes. He has no rales.   Abdominal: Soft. Bowel sounds are normal. He exhibits no mass. There is no tenderness.   Musculoskeletal: Normal range of motion. He exhibits no edema or deformity.   Lymphadenopathy:     He has no cervical adenopathy.   Neurological: He is alert and oriented to person, place, and time. No cranial nerve deficit. He exhibits normal muscle tone.   Skin: Skin is warm and dry.   "   Psychiatric: He has a normal mood and affect. His behavior is normal.     All of his recent diagnostic tests including his echocardiogram previous EKG from 2015 and stress echocardiogram were reviewed by myself.    Laboratory from 10/10/2017 showed abnormal thyroid function tests with low TSH and elevated T4.    Blood pressure log was reviewed which showed normal blood pressure measurements.    STRESS ECHOCARDIOGRAM 10/30/2014  Excellent exercise tolerance.  LVH on baseline echo.  Hypertensive BP response at peak exercise.  Negative for ischemia by EKG and Echo.    Assessment:     1. Bradycardia     2. Dyslipidemia  CT-CARDIAC SCORING   3. Essential hypertension         Medical Decision Making:  Today's Assessment / Status / Plan:     1.  I reviewed with patient his current cardiac condition.  2.  I indicated that nocturnal bradycardia particularly associated with hypoxemia as normal and that he is not a candidate for pacemaker.  3.  I think his risk for coronary disease should be further evaluated with a CT coronary calcification scan to see if he requires more intense lipid-lowering regimen.  4.  I will make further recommendations based on the above evaluation.  5.  Will await further recommendation from [facility] pulmonary group.    Thank you for allowing me see this gentleman in consultation      Tata Charles A.P.R.N.  [address] [facility] [address]  VIA Facsimile: [phone]

## 2018-07-31 ENCOUNTER — HOSPITAL ENCOUNTER (OUTPATIENT)
Dept: RADIOLOGY | Facility: MEDICAL CENTER | Age: 58
End: 2018-07-31
Attending: INTERNAL MEDICINE
Payer: COMMERCIAL

## 2018-07-31 DIAGNOSIS — E78.5 DYSLIPIDEMIA: ICD-10-CM

## 2018-07-31 PROCEDURE — 4410556 CT-CARDIAC SCORING

## 2018-08-01 ENCOUNTER — TELEPHONE (OUTPATIENT)
Dept: CARDIOLOGY | Facility: MEDICAL CENTER | Age: 58
End: 2018-08-01

## 2018-08-01 NOTE — TELEPHONE ENCOUNTER
7/31/18  Impression       1.  There is definite, at least mild, atherosclerotic plaque burden present.    2.  Minimal to mild coronary artery stenoses are likely.    3.  The patient's cardiovascular risk is moderate.    4.  Cardiovascular risk factor modification is suggested.    5.  Further evaluation should be based upon global assessment of cardiovascular risk factors in addition to the results of this test.        Calcium score is above the 25th percentile for the patient's age and sex.       To SW to review result

## 2018-08-13 ENCOUNTER — TELEPHONE (OUTPATIENT)
Dept: CARDIOLOGY | Facility: MEDICAL CENTER | Age: 58
End: 2018-08-13

## 2018-08-13 NOTE — TELEPHONE ENCOUNTER
Patient wants to get test results   Received: 4 days ago   Message Contents   LATESHA Delatorre/Trudy     Patient wants to get his test results and can be reached at [phone].      Contacted patient, discussed Cardiac Scoring result.  Patient currently on Simvastatin.  Advised to continue medications, monitor diet/activity.       To KLEVER - to further advise regarding score of 26

## 2018-11-28 ENCOUNTER — APPOINTMENT (OUTPATIENT)
Dept: RADIOLOGY | Facility: MEDICAL CENTER | Age: 58
End: 2018-11-28
Attending: EMERGENCY MEDICINE
Payer: COMMERCIAL

## 2018-11-28 ENCOUNTER — HOSPITAL ENCOUNTER (OUTPATIENT)
Dept: RADIOLOGY | Facility: MEDICAL CENTER | Age: 58
End: 2018-11-28

## 2018-11-28 ENCOUNTER — HOSPITAL ENCOUNTER (OUTPATIENT)
Facility: MEDICAL CENTER | Age: 58
End: 2018-11-29
Attending: EMERGENCY MEDICINE | Admitting: INTERNAL MEDICINE
Payer: COMMERCIAL

## 2018-11-28 DIAGNOSIS — R91.8 LUNG NODULES: ICD-10-CM

## 2018-11-28 DIAGNOSIS — L03.211 FACIAL CELLULITIS: ICD-10-CM

## 2018-11-28 LAB
ALBUMIN SERPL BCP-MCNC: 4.3 G/DL (ref 3.2–4.9)
ALBUMIN/GLOB SERPL: 1.1 G/DL
ALP SERPL-CCNC: 114 U/L (ref 30–99)
ALT SERPL-CCNC: 34 U/L (ref 2–50)
ANION GAP SERPL CALC-SCNC: 12 MMOL/L (ref 0–11.9)
APTT PPP: 31 SEC (ref 24.7–36)
AST SERPL-CCNC: 24 U/L (ref 12–45)
BASOPHILS # BLD AUTO: 0.4 % (ref 0–1.8)
BASOPHILS # BLD: 0.04 K/UL (ref 0–0.12)
BILIRUB SERPL-MCNC: 0.7 MG/DL (ref 0.1–1.5)
BLOOD CULTURE HOLD CXBCH: NORMAL
BNP SERPL-MCNC: 2 PG/ML (ref 0–100)
BUN SERPL-MCNC: 12 MG/DL (ref 8–22)
CALCIUM SERPL-MCNC: 9.5 MG/DL (ref 8.5–10.5)
CHLORIDE SERPL-SCNC: 104 MMOL/L (ref 96–112)
CO2 SERPL-SCNC: 24 MMOL/L (ref 20–33)
CREAT SERPL-MCNC: 0.92 MG/DL (ref 0.5–1.4)
EKG IMPRESSION: NORMAL
EOSINOPHIL # BLD AUTO: 0.01 K/UL (ref 0–0.51)
EOSINOPHIL NFR BLD: 0.1 % (ref 0–6.9)
ERYTHROCYTE [DISTWIDTH] IN BLOOD BY AUTOMATED COUNT: 41.9 FL (ref 35.9–50)
GLOBULIN SER CALC-MCNC: 3.8 G/DL (ref 1.9–3.5)
GLUCOSE SERPL-MCNC: 124 MG/DL (ref 65–99)
HCT VFR BLD AUTO: 46 % (ref 42–52)
HGB BLD-MCNC: 15.6 G/DL (ref 14–18)
IMM GRANULOCYTES # BLD AUTO: 0.03 K/UL (ref 0–0.11)
IMM GRANULOCYTES NFR BLD AUTO: 0.3 % (ref 0–0.9)
INR PPP: 1.05 (ref 0.87–1.13)
LACTATE BLD-SCNC: 1.5 MMOL/L (ref 0.5–2)
LYMPHOCYTES # BLD AUTO: 1.95 K/UL (ref 1–4.8)
LYMPHOCYTES NFR BLD: 18 % (ref 22–41)
MCH RBC QN AUTO: 31 PG (ref 27–33)
MCHC RBC AUTO-ENTMCNC: 33.9 G/DL (ref 33.7–35.3)
MCV RBC AUTO: 91.3 FL (ref 81.4–97.8)
MONOCYTES # BLD AUTO: 1.12 K/UL (ref 0–0.85)
MONOCYTES NFR BLD AUTO: 10.4 % (ref 0–13.4)
NEUTROPHILS # BLD AUTO: 7.67 K/UL (ref 1.82–7.42)
NEUTROPHILS NFR BLD: 70.8 % (ref 44–72)
NRBC # BLD AUTO: 0 K/UL
NRBC BLD-RTO: 0 /100 WBC
PLATELET # BLD AUTO: 261 K/UL (ref 164–446)
PMV BLD AUTO: 9 FL (ref 9–12.9)
POTASSIUM SERPL-SCNC: 3.9 MMOL/L (ref 3.6–5.5)
PROCALCITONIN SERPL-MCNC: 0.18 NG/ML
PROT SERPL-MCNC: 8.1 G/DL (ref 6–8.2)
PROTHROMBIN TIME: 13.8 SEC (ref 12–14.6)
RBC # BLD AUTO: 5.04 M/UL (ref 4.7–6.1)
SODIUM SERPL-SCNC: 140 MMOL/L (ref 135–145)
TROPONIN I SERPL-MCNC: <0.01 NG/ML (ref 0–0.04)
WBC # BLD AUTO: 10.8 K/UL (ref 4.8–10.8)

## 2018-11-28 PROCEDURE — 84145 PROCALCITONIN (PCT): CPT

## 2018-11-28 PROCEDURE — 85610 PROTHROMBIN TIME: CPT

## 2018-11-28 PROCEDURE — 84484 ASSAY OF TROPONIN QUANT: CPT

## 2018-11-28 PROCEDURE — 99285 EMERGENCY DEPT VISIT HI MDM: CPT

## 2018-11-28 PROCEDURE — 85730 THROMBOPLASTIN TIME PARTIAL: CPT

## 2018-11-28 PROCEDURE — 87040 BLOOD CULTURE FOR BACTERIA: CPT

## 2018-11-28 PROCEDURE — 71045 X-RAY EXAM CHEST 1 VIEW: CPT

## 2018-11-28 PROCEDURE — 93005 ELECTROCARDIOGRAM TRACING: CPT | Performed by: EMERGENCY MEDICINE

## 2018-11-28 PROCEDURE — 85025 COMPLETE CBC W/AUTO DIFF WBC: CPT

## 2018-11-28 PROCEDURE — A9270 NON-COVERED ITEM OR SERVICE: HCPCS | Performed by: STUDENT IN AN ORGANIZED HEALTH CARE EDUCATION/TRAINING PROGRAM

## 2018-11-28 PROCEDURE — 83605 ASSAY OF LACTIC ACID: CPT

## 2018-11-28 PROCEDURE — 83880 ASSAY OF NATRIURETIC PEPTIDE: CPT

## 2018-11-28 PROCEDURE — 80053 COMPREHEN METABOLIC PANEL: CPT

## 2018-11-28 PROCEDURE — 700105 HCHG RX REV CODE 258: Performed by: STUDENT IN AN ORGANIZED HEALTH CARE EDUCATION/TRAINING PROGRAM

## 2018-11-28 PROCEDURE — G0378 HOSPITAL OBSERVATION PER HR: HCPCS

## 2018-11-28 PROCEDURE — 700102 HCHG RX REV CODE 250 W/ 637 OVERRIDE(OP): Performed by: STUDENT IN AN ORGANIZED HEALTH CARE EDUCATION/TRAINING PROGRAM

## 2018-11-28 PROCEDURE — 36415 COLL VENOUS BLD VENIPUNCTURE: CPT

## 2018-11-28 RX ORDER — SULFAMETHOXAZOLE AND TRIMETHOPRIM 800; 160 MG/1; MG/1
1 TABLET ORAL EVERY 12 HOURS
Status: DISCONTINUED | OUTPATIENT
Start: 2018-11-28 | End: 2018-11-29

## 2018-11-28 RX ORDER — CEPHALEXIN 500 MG/1
500 CAPSULE ORAL EVERY 6 HOURS
Status: DISCONTINUED | OUTPATIENT
Start: 2018-11-29 | End: 2018-11-28

## 2018-11-28 RX ORDER — SODIUM CHLORIDE 9 MG/ML
INJECTION, SOLUTION INTRAVENOUS CONTINUOUS
Status: DISCONTINUED | OUTPATIENT
Start: 2018-11-28 | End: 2018-11-29 | Stop reason: HOSPADM

## 2018-11-28 RX ORDER — AMOXICILLIN AND CLAVULANATE POTASSIUM 875; 125 MG/1; MG/1
1 TABLET, FILM COATED ORAL 2 TIMES DAILY
Status: DISCONTINUED | OUTPATIENT
Start: 2018-11-29 | End: 2018-11-29 | Stop reason: HOSPADM

## 2018-11-28 RX ORDER — LISINOPRIL 20 MG/1
20 TABLET ORAL DAILY
Status: DISCONTINUED | OUTPATIENT
Start: 2018-11-29 | End: 2018-11-29 | Stop reason: HOSPADM

## 2018-11-28 RX ORDER — AMOXICILLIN AND CLAVULANATE POTASSIUM 875; 125 MG/1; MG/1
1 TABLET, FILM COATED ORAL 2 TIMES DAILY
Status: DISCONTINUED | OUTPATIENT
Start: 2018-11-28 | End: 2018-11-28

## 2018-11-28 RX ORDER — SIMVASTATIN 20 MG
40 TABLET ORAL EVERY EVENING
Status: DISCONTINUED | OUTPATIENT
Start: 2018-11-28 | End: 2018-11-29 | Stop reason: HOSPADM

## 2018-11-28 RX ORDER — FAMOTIDINE 20 MG/1
20 TABLET, FILM COATED ORAL NIGHTLY
Status: DISCONTINUED | OUTPATIENT
Start: 2018-11-28 | End: 2018-11-29 | Stop reason: HOSPADM

## 2018-11-28 RX ORDER — BISACODYL 10 MG
10 SUPPOSITORY, RECTAL RECTAL
Status: DISCONTINUED | OUTPATIENT
Start: 2018-11-28 | End: 2018-11-29 | Stop reason: HOSPADM

## 2018-11-28 RX ORDER — AMOXICILLIN 250 MG
2 CAPSULE ORAL 2 TIMES DAILY
Status: DISCONTINUED | OUTPATIENT
Start: 2018-11-28 | End: 2018-11-29 | Stop reason: HOSPADM

## 2018-11-28 RX ORDER — ACETAMINOPHEN 325 MG/1
650 TABLET ORAL EVERY 6 HOURS PRN
Status: DISCONTINUED | OUTPATIENT
Start: 2018-11-28 | End: 2018-11-29 | Stop reason: HOSPADM

## 2018-11-28 RX ORDER — POLYETHYLENE GLYCOL 3350 17 G/17G
1 POWDER, FOR SOLUTION ORAL
Status: DISCONTINUED | OUTPATIENT
Start: 2018-11-28 | End: 2018-11-29 | Stop reason: HOSPADM

## 2018-11-28 RX ADMIN — SODIUM CHLORIDE: 9 INJECTION, SOLUTION INTRAVENOUS at 23:32

## 2018-11-28 RX ADMIN — FAMOTIDINE 20 MG: 20 TABLET ORAL at 23:31

## 2018-11-28 RX ADMIN — SULFAMETHOXAZOLE AND TRIMETHOPRIM 1 TABLET: 800; 160 TABLET ORAL at 23:31

## 2018-11-28 RX ADMIN — ASPIRIN 81 MG: 81 TABLET, COATED ORAL at 23:31

## 2018-11-28 RX ADMIN — SIMVASTATIN 40 MG: 20 TABLET, FILM COATED ORAL at 23:31

## 2018-11-28 ASSESSMENT — PATIENT HEALTH QUESTIONNAIRE - PHQ9
SUM OF ALL RESPONSES TO PHQ9 QUESTIONS 1 AND 2: 0
2. FEELING DOWN, DEPRESSED, IRRITABLE, OR HOPELESS: NOT AT ALL
1. LITTLE INTEREST OR PLEASURE IN DOING THINGS: NOT AT ALL

## 2018-11-28 ASSESSMENT — PAIN SCALES - GENERAL: PAINLEVEL_OUTOF10: 3

## 2018-11-28 NOTE — LETTER
"December 10, 2018         Benito Davis  [address] [place] [address] [place] [address]        Dear Benito:      Below are the results from your recent visit:    Resulted Orders   BLOOD CULTURE   Result Value Ref Range    Significant Indicator NEG     Source BLD     Site PERIPHERAL     Blood Culture No growth after 5 days of incubation.     Narrative    Per Hospital Policy: Only change Specimen Src: to \"Line\" if  specified by physician order.   BLOOD CULTURE   Result Value Ref Range    Significant Indicator NEG     Source BLD     Site PERIPHERAL     Blood Culture No growth after 5 days of incubation.     Narrative    Per Hospital Policy: Only change Specimen Src: to \"Line\" if  specified by physician order.   Blood Culture   Result Value Ref Range    Significant Indicator NEG     Source BLD     Site PERIPHERAL     Blood Culture No growth after 5 days of incubation.     Narrative    *SPECIMEN IN LAB* DO NOT REDRAW     The test results show that negative blood culture .    If you have any questions or concerns, please don't hesitate to call.        Sincerely,      Patricia Billingsley M.D.    Electronically Signed  "

## 2018-11-29 ENCOUNTER — APPOINTMENT (OUTPATIENT)
Dept: RADIOLOGY | Facility: MEDICAL CENTER | Age: 58
End: 2018-11-29
Attending: STUDENT IN AN ORGANIZED HEALTH CARE EDUCATION/TRAINING PROGRAM
Payer: COMMERCIAL

## 2018-11-29 ENCOUNTER — PATIENT OUTREACH (OUTPATIENT)
Dept: HEALTH INFORMATION MANAGEMENT | Facility: OTHER | Age: 58
End: 2018-11-29

## 2018-11-29 VITALS
BODY MASS INDEX: 29.81 KG/M2 | TEMPERATURE: 98.6 F | OXYGEN SATURATION: 93 % | HEIGHT: 71 IN | DIASTOLIC BLOOD PRESSURE: 77 MMHG | HEART RATE: 78 BPM | RESPIRATION RATE: 16 BRPM | SYSTOLIC BLOOD PRESSURE: 132 MMHG | WEIGHT: 212.96 LBS

## 2018-11-29 PROBLEM — L03.211 CELLULITIS OF FACE: Status: ACTIVE | Noted: 2018-11-29

## 2018-11-29 PROBLEM — R91.8 MULTIPLE LUNG NODULES ON CT: Status: ACTIVE | Noted: 2018-11-29

## 2018-11-29 LAB
ALBUMIN SERPL BCP-MCNC: 3.7 G/DL (ref 3.2–4.9)
ALBUMIN/GLOB SERPL: 1.2 G/DL
ALP SERPL-CCNC: 96 U/L (ref 30–99)
ALT SERPL-CCNC: 25 U/L (ref 2–50)
ANION GAP SERPL CALC-SCNC: 8 MMOL/L (ref 0–11.9)
AST SERPL-CCNC: 18 U/L (ref 12–45)
BASOPHILS # BLD AUTO: 0.4 % (ref 0–1.8)
BASOPHILS # BLD: 0.04 K/UL (ref 0–0.12)
BILIRUB SERPL-MCNC: 0.5 MG/DL (ref 0.1–1.5)
BUN SERPL-MCNC: 14 MG/DL (ref 8–22)
CALCIUM SERPL-MCNC: 8.7 MG/DL (ref 8.5–10.5)
CHLORIDE SERPL-SCNC: 107 MMOL/L (ref 96–112)
CO2 SERPL-SCNC: 26 MMOL/L (ref 20–33)
CREAT SERPL-MCNC: 1.04 MG/DL (ref 0.5–1.4)
EOSINOPHIL # BLD AUTO: 0.06 K/UL (ref 0–0.51)
EOSINOPHIL NFR BLD: 0.6 % (ref 0–6.9)
ERYTHROCYTE [DISTWIDTH] IN BLOOD BY AUTOMATED COUNT: 42.4 FL (ref 35.9–50)
GLOBULIN SER CALC-MCNC: 3.1 G/DL (ref 1.9–3.5)
GLUCOSE SERPL-MCNC: 99 MG/DL (ref 65–99)
HCT VFR BLD AUTO: 41.8 % (ref 42–52)
HGB BLD-MCNC: 14.4 G/DL (ref 14–18)
HIV 1+2 AB+HIV1 P24 AG SERPL QL IA: NON REACTIVE
IMM GRANULOCYTES # BLD AUTO: 0.03 K/UL (ref 0–0.11)
IMM GRANULOCYTES NFR BLD AUTO: 0.3 % (ref 0–0.9)
LYMPHOCYTES # BLD AUTO: 2.3 K/UL (ref 1–4.8)
LYMPHOCYTES NFR BLD: 24.5 % (ref 22–41)
MAGNESIUM SERPL-MCNC: 2.1 MG/DL (ref 1.5–2.5)
MCH RBC QN AUTO: 31.9 PG (ref 27–33)
MCHC RBC AUTO-ENTMCNC: 34.4 G/DL (ref 33.7–35.3)
MCV RBC AUTO: 92.5 FL (ref 81.4–97.8)
MONOCYTES # BLD AUTO: 1.58 K/UL (ref 0–0.85)
MONOCYTES NFR BLD AUTO: 16.8 % (ref 0–13.4)
NEUTROPHILS # BLD AUTO: 5.38 K/UL (ref 1.82–7.42)
NEUTROPHILS NFR BLD: 57.4 % (ref 44–72)
NRBC # BLD AUTO: 0 K/UL
NRBC BLD-RTO: 0 /100 WBC
PLATELET # BLD AUTO: 241 K/UL (ref 164–446)
PMV BLD AUTO: 9.2 FL (ref 9–12.9)
POTASSIUM SERPL-SCNC: 4.3 MMOL/L (ref 3.6–5.5)
PROT SERPL-MCNC: 6.8 G/DL (ref 6–8.2)
RBC # BLD AUTO: 4.52 M/UL (ref 4.7–6.1)
SODIUM SERPL-SCNC: 141 MMOL/L (ref 135–145)
T4 FREE SERPL-MCNC: 1.22 NG/DL (ref 0.53–1.43)
TSH SERPL DL<=0.005 MIU/L-ACNC: 0.17 UIU/ML (ref 0.38–5.33)
WBC # BLD AUTO: 9.4 K/UL (ref 4.8–10.8)

## 2018-11-29 PROCEDURE — 99220 PR INITIAL OBSERVATION CARE,LEVL III: CPT | Performed by: INTERNAL MEDICINE

## 2018-11-29 PROCEDURE — A9270 NON-COVERED ITEM OR SERVICE: HCPCS | Performed by: STUDENT IN AN ORGANIZED HEALTH CARE EDUCATION/TRAINING PROGRAM

## 2018-11-29 PROCEDURE — G0378 HOSPITAL OBSERVATION PER HR: HCPCS

## 2018-11-29 PROCEDURE — 85025 COMPLETE CBC W/AUTO DIFF WBC: CPT

## 2018-11-29 PROCEDURE — 71260 CT THORAX DX C+: CPT

## 2018-11-29 PROCEDURE — 83735 ASSAY OF MAGNESIUM: CPT

## 2018-11-29 PROCEDURE — 87389 HIV-1 AG W/HIV-1&-2 AB AG IA: CPT

## 2018-11-29 PROCEDURE — 80053 COMPREHEN METABOLIC PANEL: CPT

## 2018-11-29 PROCEDURE — 36415 COLL VENOUS BLD VENIPUNCTURE: CPT

## 2018-11-29 PROCEDURE — 700117 HCHG RX CONTRAST REV CODE 255: Performed by: STUDENT IN AN ORGANIZED HEALTH CARE EDUCATION/TRAINING PROGRAM

## 2018-11-29 PROCEDURE — 84443 ASSAY THYROID STIM HORMONE: CPT

## 2018-11-29 PROCEDURE — 700102 HCHG RX REV CODE 250 W/ 637 OVERRIDE(OP): Performed by: STUDENT IN AN ORGANIZED HEALTH CARE EDUCATION/TRAINING PROGRAM

## 2018-11-29 PROCEDURE — 84439 ASSAY OF FREE THYROXINE: CPT

## 2018-11-29 PROCEDURE — 700105 HCHG RX REV CODE 258: Performed by: STUDENT IN AN ORGANIZED HEALTH CARE EDUCATION/TRAINING PROGRAM

## 2018-11-29 RX ORDER — AMOXICILLIN AND CLAVULANATE POTASSIUM 875; 125 MG/1; MG/1
1 TABLET, FILM COATED ORAL 2 TIMES DAILY
Qty: 12 TAB | Refills: 0 | Status: SHIPPED | OUTPATIENT
Start: 2018-11-29 | End: 2019-02-04

## 2018-11-29 RX ORDER — ACETAMINOPHEN 325 MG/1
650 TABLET ORAL EVERY 6 HOURS PRN
Qty: 30 TAB | Refills: 0 | Status: SHIPPED | OUTPATIENT
Start: 2018-11-29 | End: 2019-02-04

## 2018-11-29 RX ADMIN — IOHEXOL 75 ML: 350 INJECTION, SOLUTION INTRAVENOUS at 15:26

## 2018-11-29 RX ADMIN — SODIUM CHLORIDE: 9 INJECTION, SOLUTION INTRAVENOUS at 05:23

## 2018-11-29 RX ADMIN — LISINOPRIL 20 MG: 20 TABLET ORAL at 05:22

## 2018-11-29 RX ADMIN — AMOXICILLIN AND CLAVULANATE POTASSIUM 1 TABLET: 875; 125 TABLET, FILM COATED ORAL at 05:22

## 2018-11-29 RX ADMIN — SULFAMETHOXAZOLE AND TRIMETHOPRIM 1 TABLET: 800; 160 TABLET ORAL at 05:22

## 2018-11-29 RX ADMIN — AMOXICILLIN AND CLAVULANATE POTASSIUM 1 TABLET: 875; 125 TABLET, FILM COATED ORAL at 17:06

## 2018-11-29 ASSESSMENT — ENCOUNTER SYMPTOMS
STRIDOR: 0
HEADACHES: 0
NERVOUS/ANXIOUS: 1
CHILLS: 0
NECK PAIN: 0
SPUTUM PRODUCTION: 0
DEPRESSION: 0
COUGH: 0
BLURRED VISION: 0
FEVER: 0
FALLS: 0
SHORTNESS OF BREATH: 0
PALPITATIONS: 0
NAUSEA: 0
EYE REDNESS: 0
SPEECH CHANGE: 0
VOMITING: 0
WEIGHT LOSS: 0
ABDOMINAL PAIN: 0
SINUS PAIN: 0

## 2018-11-29 ASSESSMENT — COGNITIVE AND FUNCTIONAL STATUS - GENERAL
DAILY ACTIVITIY SCORE: 24
SUGGESTED CMS G CODE MODIFIER DAILY ACTIVITY: CH
SUGGESTED CMS G CODE MODIFIER MOBILITY: CH
MOBILITY SCORE: 24

## 2018-11-29 ASSESSMENT — LIFESTYLE VARIABLES
EVER_SMOKED: YES
ALCOHOL_USE: NO

## 2018-11-29 ASSESSMENT — COPD QUESTIONNAIRES
IN THE PAST 12 MONTHS DO YOU DO LESS THAN YOU USED TO BECAUSE OF YOUR BREATHING PROBLEMS: DISAGREE/UNSURE
DO YOU EVER COUGH UP ANY MUCUS OR PHLEGM?: NO/ONLY WITH OCCASIONAL COLDS OR INFECTIONS
HAVE YOU SMOKED AT LEAST 100 CIGARETTES IN YOUR ENTIRE LIFE: NO/DON'T KNOW
DURING THE PAST 4 WEEKS HOW MUCH DID YOU FEEL SHORT OF BREATH: NONE/LITTLE OF THE TIME
COPD SCREENING SCORE: 1

## 2018-11-29 ASSESSMENT — PAIN SCALES - GENERAL
PAINLEVEL_OUTOF10: 2
PAINLEVEL_OUTOF10: 3
PAINLEVEL_OUTOF10: 3
PAINLEVEL_OUTOF10: 2

## 2018-11-29 NOTE — DISCHARGE SUMMARY
Internal Medicine Discharge Summary  Note Author: Patricia Billingsley M.D.       Admit Date:  11/28/2018       Discharge Date:   11/29/2018    Service:   [facility] Internal Medicine [facility] Team  Attending Physician(s):   Dr. Pringle       Senior Resident(s):   Dr. Billingsley  [place] Resident(s):   Dr. Archibald  PCP: GÓMEZ Chu      Primary Diagnosis:   - Incidental lung nodules on CTA neck (CTA done for F/U on Rt jaw abscess) (Septic emboli vs metastatic cancer vs something else) (Pt is an employee at [employer] but he is not a [employer]).  - Right mandibular abscess with no discharge/open duct or wound/fistula or associated cervical lymphadenopathy.    Secondary Diagnoses:                Principal Problem:    Multiple lung nodules on CT POA: Yes  Active Problems:    Cellulitis of face POA: Yes    HTN (hypertension) POA: Yes    Dyslipidemia POA: Yes  Resolved Problems:    * No resolved hospital problems. *      Hospital Summary (Brief Narrative):       Mr. Davis is a 59 yo male who presented to the ED as a transfer from the [employer] [employer] for incidental lung nodules (Septic emboli vs metastatic cancer vs something else)(Pt is an employee at [employer] but he is not a [employer]) (Pt is in stable medical condition and in no distress).    PMH is significant for Sialolithiasis, essential HTN, DLD, GERD, nephrolithiasis, bradycardia during sleep.    The patient reports 5 days of swelling and redness at his right lower jaw towards his Rt ear which was associated with subjective fever and chills (Patient stated he thought it could be related to salivary stones that he has had before). The patient was evaluated by his PCP and was started on Augmentin for two days with improvement (today is day 3, total treatment for 10 days).   Denies chest pain, dyspnea, cough, drooling, choking. States he feels like his normal self other than mild tenderness of his face. Denies asthma history. Denies cancer history, no FHx of  "cancer except for grandmother at age of late 70s (Pt not sure about the type of cancer), Reports remote H/O cigarettes smoking >35y ago (4 years 0.5 PPD in college).  Negative H/O of IV drug abuse.  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 Was previously a .    At the [employer], CT neck with contrast was done, Radiology impression: \"1. Right lower facial cellulitis 2. Multiple bilateral upper lobe pulmonary nodules, one of which is cavitary. Consider metastatic disease and septic emboli.\" No prior imaging of lungs available from [facility], only other imaging was a CT head w/o on 10/08/14. Patient's place of residency has changed a lot and he does not think he has had previous chest imaging before.    Labs included procalcitonin 0.36, lactic acid 1.1, and bland CBC and CMP. Quantiferon-TB from 5/15/16 and 10/4/18 both were negative per [facility] records.    Physical exam is positive for Firm mildly tender 2-cm nodule at right mandibular angle with mildly surrounding erythema without purulence or discharge or open duct/fistula. No other cervical nodules or lymphadenopathy appreciated.  Cardiac and lung examination are unremarkable.    The patient was admitted to [facility] for observation and for doing CT chest with and without contrast.    We discuss with the radiologist the the possibility of doing another CT scan with contrast as the patient had CT with contrast yesterday. The radiologist is fine with repeating the CT scan with contrast after 24 hours from the first one if his kidney function test is normal.    The patient was on maintenance IVF NS to help clearing the contrast from his system.  The kidney function test is within normal limites,  The CT chest with and without contrast was done after 24 hours after the fist CT scan with contrast for his neck.  The CT chest with and without " contrast was significant for bilateral pulmonary nodules and ill-defined nodular opacities, findings are suggestive for infectious vs non infectious vs chronic granulomatous couldn't exclude malignancy.    We discuss the results with the patient and he agreed to follow up with his PCP after finishing the course of ABxs (10 days of Augmentin) to rule out infectious causes,     Patient was discharged home in stable medical condition with the following recommendations:  - Follow up with PCP GÓMEZ Chu On 12/4/2018 at 2:30 PM, with potential need for referral to pulmonologist to follow up on pulmonary nodules.  - Patient might needs to follow up on pulmonary nodules with repeating imaging studies but we will leave that to his PCP/pulmonologisty to decide on that after finishing the course of Abxs (total 10 days of Augmentin)  - Pt was advised to call his PCP or come back to the ED if his condition did not improve or getting worse.      Patient /Hospital Summary (Details -- Problem Oriented) :          Right mandibular abscess with no discharge.   Assessment & Plan    - 2cm x 2cm firm swelling on rt jaw, mild eryhtema, mildly tender  - Got 2 doses of Augmentin before [facility] came in to [facility]  - 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[employer]  Plan:  - Admit for observation  - Aspiration and fall precautions  - Continue Augmentin  - Blood cultures pending, came back negative on 11/29/2018  - Lovenox for DVT prophylaxis  - Regular diet     * Multiple lung nodules on CT   Assessment & Plan    - Incidental finding on CT neck for mandibular abscess at [facility]  - Transferred here for follow up  - Could not get another imaging with contrast on admission since got one in the last 24 hours, discuss it with radiologist, is okay to get another CT scan after 24 hours if kidney function is normal.  - Negative Quantiferon  Plan:  - IVF for contrast nephropathy prevention  - CT chest w/wo contrast   - PCP appointment on 12/4/2018, might consider  pulmonologist referral and repeating images for follow up on lung nodules after finishing Abxs course of 10 days totally (might repeat images after 4-6 weeks minimally, will leave it to his PCP and Pulmonologist to decide)          Dyslipidemia   Assessment & Plan    - Continue statins     HTN (hypertension)   Assessment & Plan    - Continue lisinopril         Consultants:     None    Procedures:        none    Imaging/ Testing:      CT-CHEST (THORAX) WITH   Final Result      Bilateral pulmonary nodules and ill-defined nodular opacities. Some of these have surrounding groundglass opacity. A pleural-based 1.5 mm nodule in the left upper lobe exhibits mild cavitation. Findings may be infectious, related to noninfective    granulomatous disease, with malignancy not excluded.      Borderline prominent mediastinal lymph nodes are nonspecific.      Atherosclerotic plaque including coronary artery calcification.      Bilateral nonobstructing renal calculi.      Air-fluid levels in the colon can be seen in the setting of diarrhea.      CT-FOREIGN FILM CAT SCAN   Final Result      DX-CHEST-PORTABLE (1 VIEW)   Final Result      No acute cardiopulmonary abnormality. No radiographic evidence of pneumonia.            Discharge Medications:         Medication Reconciliation: Completed       Medication List      START taking these medications      Instructions   acetaminophen 325 MG Tabs  Commonly known as:  TYLENOL   Take 2 Tabs by mouth every 6 hours as needed (Mild Pain; (Pain scale 1-3); Temp greater than 100.5 F).  Dose:  650 mg        CHANGE how you take these medications      Instructions   amoxicillin-clavulanate 875-125 MG Tabs  What changed:  · medication strength  · when to take this  · additional instructions  Commonly known as:  AUGMENTIN   Take 1 Tab by mouth 2 times a day.  Dose:  1 Tab        CONTINUE taking these medications      Instructions   aspirin EC 81 MG Tbec  Commonly known as:  ECOTRIN   Take 81 mg by  mouth every bedtime.  Dose:  81 mg     lisinopril 20 MG Tabs  Commonly known as:  PRINIVIL   Take 1 Tab by mouth every day.  Dose:  20 mg     ranitidine 75 MG tablet  Commonly known as:  ZANTAC   Take 35 mg by mouth every evening.  Dose:  35 mg     simvastatin 40 MG Tabs  Commonly known as:  ZOCOR   Take 1 Tab by mouth every evening.  Dose:  40 mg        STOP taking these medications    ROCEPHIN IV            Can use .DISCHARGEMEDSLIST if going to another facility         Disposition:   Home    Diet:   Regular    Activity:   As tolerated    Instructions:      Please see above   The patient was instructed to return to the ER in the event of worsening symptoms. I have counseled the patient on the importance of compliance and the patient has agreed to proceed with all medical recommendations and follow up plan indicated above.   The patient understands that all medications come with benefits and risks. Risks may include permanent injury or death and these risks can be minimized with close reassessment and monitoring.        Primary Care Provider:    GÓMEZ Chu    Discharge summary faxed to primary care provider:  Completed  Copy of discharge summary given to the patient: Completed      Follow up appointment details :      12/4/2018 at 2:15 PM with GÓMEZ Chu      Time spent on discharge day patient visit, preparing discharge paperwork and arranging for patient follow up.      Discharge Time (Minutes) :    40 minutes  Hospital Course Type: Observation Stay      Condition on Discharge    Stable medical condition______________________________________________________________________    Interval history/exam for day of discharge:         Vitals:    11/29/18 0400 11/29/18 0750 11/29/18 1200 11/29/18 1600   BP: 117/63 128/78 134/82 132/77   Pulse: 80 75 77 78   Resp: 16 16 16 16   Temp: 36.8 °C (98.2 °F) 37.2 °C (98.9 °F) 36.8 °C (98.2 °F) 37 °C (98.6 °F)   TempSrc: Temporal Temporal Temporal  Temporal   SpO2: 100% 93% 96% 93%   Weight:       Height:         Weight/BMI: Body mass index is 29.7 kg/m².  Pulse Oximetry: 93 %, O2 (LPM): 0, O2 Delivery: None (Room Air)    General: Not in acute distress  CVS: Regular rate and rhythm, no murmurs or gallops, no additional sounds (S3/S4)  PULM: Clear to ausculation and percussion bilaterally      Most Recent Labs:    Lab Results   Component Value Date/Time    WBC 9.4 11/29/2018 03:23 AM    RBC 4.52 (L) 11/29/2018 03:23 AM    HEMOGLOBIN 14.4 11/29/2018 03:23 AM    HEMATOCRIT 41.8 (L) 11/29/2018 03:23 AM    MCV 92.5 11/29/2018 03:23 AM    MCH 31.9 11/29/2018 03:23 AM    MCHC 34.4 11/29/2018 03:23 AM    MPV 9.2 11/29/2018 03:23 AM    NEUTSPOLYS 57.40 11/29/2018 03:23 AM    LYMPHOCYTES 24.50 11/29/2018 03:23 AM    MONOCYTES 16.80 (H) 11/29/2018 03:23 AM    EOSINOPHILS 0.60 11/29/2018 03:23 AM    BASOPHILS 0.40 11/29/2018 03:23 AM      Lab Results   Component Value Date/Time    SODIUM 141 11/29/2018 03:23 AM    POTASSIUM 4.3 11/29/2018 03:23 AM    CHLORIDE 107 11/29/2018 03:23 AM    CO2 26 11/29/2018 03:23 AM    GLUCOSE 99 11/29/2018 03:23 AM    BUN 14 11/29/2018 03:23 AM    CREATININE 1.04 11/29/2018 03:23 AM    BUNCREATRAT 12 01/12/2015 04:03 PM      Lab Results   Component Value Date/Time    ALTSGPT 25 11/29/2018 03:23 AM    ASTSGOT 18 11/29/2018 03:23 AM    ALKPHOSPHAT 96 11/29/2018 03:23 AM    TBILIRUBIN 0.5 11/29/2018 03:23 AM    ALBUMIN 3.7 11/29/2018 03:23 AM    GLOBULIN 3.1 11/29/2018 03:23 AM    INR 1.05 11/28/2018 07:35 PM     Lab Results   Component Value Date/Time    PROTHROMBTM 13.8 11/28/2018 07:35 PM    INR 1.05 11/28/2018 07:35 PM

## 2018-11-29 NOTE — ASSESSMENT & PLAN NOTE
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

## 2018-11-29 NOTE — DISCHARGE PLANNING
Social Work Student     Anticipated Discharge Disposition: TBD    Action: Social work student met with pt at bedside to complete assessment. The pt reported having no questions or concerns at this time.      Barriers to Discharge: Medical Clearance     Plan: LSW will follow pt    Care Transition Team Assessment    Information Source  Orientation : Oriented x 4  Information Given By: Patient  Informant's Name: Fernando Davis  Who is responsible for making decisions for patient? : Patient    Readmission Evaluation  Is this a readmission?: No    Elopement Risk  Legal Hold: No  Ambulatory or Self Mobile in Wheelchair: Yes  Disoriented: No  Psychiatric Symptoms: None  History of Wandering: No  Elopement this Admit: No  Vocalizing Wanting to Leave: No  Displays Behaviors, Body Language Wanting to Leave: No-Not at Risk for Elopement    Interdisciplinary Discharge Planning  Patient or legal guardian wants to designate a caregiver (see row info): No    Discharge Preparedness  What is your plan after discharge?: Uncertain - pending medical team collaboration  What are your discharge supports?: Partner  Prior Functional Level: Ambulatory, Drives Self, Independent with Activities of Daily Living, Independent with Medication Management  Difficulity with ADLs: None  Difficulity with IADLs: None    Functional Assesment  Prior Functional Level: Ambulatory, Drives Self, Independent with Activities of Daily Living, Independent with Medication Management    Finances  Financial Barriers to Discharge: No  Prescription Coverage: Yes    Vision / Hearing Impairment  Vision Impairment : No  Hearing Impairment : No    Values / Beliefs / Concerns  Values / Beliefs Concerns : No    Advance Directive  Advance Directive?: None  Advance Directive offered?: AD Booklet given    Domestic Abuse  Have you ever been the victim of abuse or violence?: No  Physical Abuse or Sexual Abuse: No  Verbal Abuse or Emotional Abuse: No    Psychological  Assessment  History of Substance Abuse: None  History of Psychiatric Problems: No  Non-compliant with Treatment: No  Newly Diagnosed Illness: No    Discharge Risks or Barriers  Discharge risks or barriers?: No  Patient risk factors: Other (comment) (None)    Anticipated Discharge Information  Anticipated discharge disposition: Other (comment) (In Progress)    Reviewed by TESSA Butler

## 2018-11-29 NOTE — ED NOTES
Med rec completed per pt.   Antibiotics within last 30 days: Yes  Patient allergies have been reviewed: Yes  Comments: Pt was started on Augmentin (unknown dose) on 11/27.

## 2018-11-29 NOTE — ED PROVIDER NOTES
ED Provider Note    Scribed for Leyla Lopez D.O. by Norma Fuentes. 11/28/2018, 6:04 PM.    Primary care provider: GÓMEZ Chu  Means of arrival: walk in  History obtained from: Patient  History limited by: none    CHIEF COMPLAINT  Chief Complaint   Patient presents with   • Sent by MD MARQUEZ  Benito Davis is a 58 y.o. male who [facility] a mass on the right side of his jaw onset three days ago with worsening severity today. He confirms associated, mild right neck pain with range of motion that he rates as a 3/10 in severity. He additionally has had subjective fever and chills. Jefferson denies any night sweats, weight loss, coughing, chest pain, vomiting, diarrhea, difficulty swallowing, difficulty speaking, tooth pain, hemoptysis, back pain, chest pain, shortness of breath. He had a CT scan of his neck performed today which revealed possible cancerous nodules in his lungs. He took amoxicillin with mild alleviation of his symptoms. Jefferson states that the mass feels smaller in size after taking the amoxicillin. Valente reports that he smoked cigarettes for a few years in the past but does not smoke currently. He reports that he lived in [place] for seven years when he was in his 20s and regularly tests positive for skin TB tests but he [facility].     REVIEW OF SYSTEMS  See HPI for further details. All other systems are negative.     PAST MEDICAL HISTORY   has a past medical history of Heart burn; Hypertension; Indigestion; and Renal disorder.    SURGICAL HISTORY   has a past surgical history that includes other abdominal surgery (1990); other (2012); cystoscopy (10/2/2015); ureteroscopy (Right, 10/2/2015); lasertripsy (10/2/2015); and stent placement (Left, 10/2/2015).    SOCIAL HISTORY  Social History   Substance Use Topics   • Smoking status: Former Smoker     Packs/day: 0.50     Years: 4.00     Types:  Cigarettes     Quit date: 10/20/1983   • Smokeless tobacco: Never Used   • Alcohol use Yes      Comment: six pack a month      History   Drug Use No       FAMILY HISTORY  None noted.    CURRENT MEDICATIONS  Reviewed.  See Encounter Summary.     ALLERGIES  Allergies   Allergen Reactions   • Codeine Rash       PHYSICAL EXAM  VITAL SIGNS: /83   Pulse 98   Temp 36.8 °C (98.2 °F) (Temporal)   Resp 16   Wt 97.3 kg (214 lb 8.1 oz)   SpO2 93%   BMI 30.78 kg/m²   Constitutional: Alert and in no apparent distress.  HENT: Normocephalic atraumatic. Bilateral external ears normal. Nose normal. Mucous membranes are moist.  Eyes: Pupils are equal and reactive. Conjunctiva normal. Non-icteric sclera.   Neck: Normal range of motion without tenderness. Supple. No meningeal signs. 2 cm by 3 cm area of induration, warmth, and erythema just anterior to the right angle of the mandible. Trachea is midline, no difficulty speaking or swallowing  Cardiovascular: Regular rate and rhythm. No murmurs, gallops or rubs.  Thorax & Lungs: Breath sounds are clear to auscultation bilaterally. No wheezing, rhonchi or rales.  Abdomen: Soft, nontender and nondistended. No peritoneal signs noted.  Skin: Warm and dry. No rashes are noted.  Back: No bony tenderness, No CVA tenderness.   Extremities: 2+ peripheral pulses. Cap refill is less than 2 seconds. No edema, cyanosis, or clubbing.  Musculoskeletal: Good range of motion in all major joints. No tenderness to palpation or major deformities noted.   Neurologic: Alert and oriented ×3. The patient moves all 4 extremities and follows commands.  Psychiatric: Affect is normal. Judgment appears to be intact.    DIAGNOSTIC STUDIES / PROCEDURES     LABS  Results for orders placed or performed during the hospital encounter of 11/28/18   CBC WITH DIFFERENTIAL   Result Value Ref Range    WBC 10.8 4.8 - 10.8 K/uL    RBC 5.04 4.70 - 6.10 M/uL    Hemoglobin 15.6 14.0 - 18.0 g/dL    Hematocrit 46.0 42.0 -  52.0 %    MCV 91.3 81.4 - 97.8 fL    MCH 31.0 27.0 - 33.0 pg    MCHC 33.9 33.7 - 35.3 g/dL    RDW 41.9 35.9 - 50.0 fL    Platelet Count 261 164 - 446 K/uL    MPV 9.0 9.0 - 12.9 fL    Neutrophils-Polys 70.80 44.00 - 72.00 %    Lymphocytes 18.00 (L) 22.00 - 41.00 %    Monocytes 10.40 0.00 - 13.40 %    Eosinophils 0.10 0.00 - 6.90 %    Basophils 0.40 0.00 - 1.80 %    Immature Granulocytes 0.30 0.00 - 0.90 %    Nucleated RBC 0.00 /100 WBC    Neutrophils (Absolute) 7.67 (H) 1.82 - 7.42 K/uL    Lymphs (Absolute) 1.95 1.00 - 4.80 K/uL    Monos (Absolute) 1.12 (H) 0.00 - 0.85 K/uL    Eos (Absolute) 0.01 0.00 - 0.51 K/uL    Baso (Absolute) 0.04 0.00 - 0.12 K/uL    Immature Granulocytes (abs) 0.03 0.00 - 0.11 K/uL    NRBC (Absolute) 0.00 K/uL   COMP METABOLIC PANEL   Result Value Ref Range    Sodium 140 135 - 145 mmol/L    Potassium 3.9 3.6 - 5.5 mmol/L    Chloride 104 96 - 112 mmol/L    Co2 24 20 - 33 mmol/L    Anion Gap 12.0 (H) 0.0 - 11.9    Glucose 124 (H) 65 - 99 mg/dL    Bun 12 8 - 22 mg/dL    Creatinine 0.92 0.50 - 1.40 mg/dL    Calcium 9.5 8.5 - 10.5 mg/dL    AST(SGOT) 24 12 - 45 U/L    ALT(SGPT) 34 2 - 50 U/L    Alkaline Phosphatase 114 (H) 30 - 99 U/L    Total Bilirubin 0.7 0.1 - 1.5 mg/dL    Albumin 4.3 3.2 - 4.9 g/dL    Total Protein 8.1 6.0 - 8.2 g/dL    Globulin 3.8 (H) 1.9 - 3.5 g/dL    A-G Ratio 1.1 g/dL   TROPONIN   Result Value Ref Range    Troponin I <0.01 0.00 - 0.04 ng/mL   BTYPE NATRIURETIC PEPTIDE   Result Value Ref Range    B Natriuretic Peptide 2 0 - 100 pg/mL   LACTIC ACID   Result Value Ref Range    Lactic Acid 1.5 0.5 - 2.0 mmol/L   PROCALCITONIN   Result Value Ref Range    Procalcitonin 0.18 <0.25 ng/mL   PROTHROMBIN TIME   Result Value Ref Range    PT 13.8 12.0 - 14.6 sec    INR 1.05 0.87 - 1.13   APTT   Result Value Ref Range    APTT 31.0 24.7 - 36.0 sec   ESTIMATED GFR   Result Value Ref Range    GFR If African American >60 >60 mL/min/1.73 m 2    GFR If Non African American >60 >60 mL/min/1.73  m 2   BLOOD CULTURE,HOLD   Result Value Ref Range    Blood Culture Hold Collected    EKG (NOW)   Result Value Ref Range    Report       [facility] Emergency Dept.    Test Date:  2018  Pt Name:    TAMY BARRIENTOS                 Department: ER  MRN:        [id number]                      Room:       [facility]  Gender:     Male                         Technician: GEETA  :        1960                   Requested By:LEYLA EVANS  Order #:    [id number]                    Reading MD: Leyla Evans    Measurements  Intervals                                Axis  Rate:       86                           P:          32  KY:         172                          QRS:        46  QRSD:       82                           T:          -2  QT:         348  QTc:        417    Interpretive Statements  SINUS RHYTHM  BORDERLINE T ABNORMALITIES, INFERIOR LEADS  Compared to ECG 2015 12:53:02  Borderline T-wave abnormality now present    Electronically Signed On 2018 18:52:25 PST by Leyla Evans         All labs were reviewed by me.    EKG  EKG was performed at 18:40 shows normal sinus rhythm with heart rate of 86. KY interval is 172. QT/QTc are 340/417. Axis appears normal. No acute ST elevations or depressions are noted. Previous EKG from  was reviewed, today's EKG shows borderline T wave abnormalities which are new. EKG is otherwise unchanged. Impression: Borderline EKG      RADIOLOGY  CT-FOREIGN FILM CAT SCAN   Final Result      DX-CHEST-PORTABLE (1 VIEW)   Final Result      No acute cardiopulmonary abnormality. No radiographic evidence of pneumonia.        The radiologist's interpretation of all radiological studies have been reviewed by me.    COURSE & MEDICAL DECISION MAKING  Pertinent Labs & Imaging studies reviewed. (See chart for details)    6:15 PM - Past medical records reviewed which reveal that Jefferson was initially seen at an outside facility for a mass on his right jaw onset three  days ago with worsening severity in the past two days. He has associated subjective fever and chills. He has a history ofsalivary duct stones. He had an outside facility workup including blood work which revealed normal CBC, mildly elevated procalcitonin mildly of 0.36, normal coags, normal CMP. He had a CT-neck which revealed right lower facial cellulitis. It additionally showed mulitl bilateral upper lobe pulmnary nodules, one of which is cavitary. Possible differentials include metastatic disease and/or septic eboli. HE additionally received 2g IV rocephin.    6:20 PM - Patient seen and examined at bedside. Ordered PTT, APTT, Dx-chest, CBC, CMP, troponin, BNP, lactic acid, procalcitonin, blood culture, EKG to evaluate his symptoms. Informed that he will need to have a CT scan of his chest while he is in the ED. Discussed plan of care including speaking with infectious disease. Patient understands and agrees to plan of care.    6:29 PM Spoke to radiology tech. They state that the patient will need to wait 24 hours before having another CT scan with contrast.    8:32 PM Paged R Internal Medicine.      8:42 PM Spoke to Dr. Rodriguez ([facility] IM) who agrees to admit the patient.    Decision Making:  This is a 58 y.o. year old male who presents with an incidental finding of lung nodules on neck CT.  The patient had had IV contrast within the last 6 hours and per protocol another CT with IV contrast do not be performed for 24 hours.  Preliminary workup with labs, EKG, and chest x-ray here in the emergency department were reassuring.  The patient is noted to have a small cellulitis along the right mandible.  This appears improved per history and he has no evidence of sepsis.  He had no evidence of airway compromise and was tolerating his secretions without difficulty.  The plan was made to admit the patient for repeat CAT scan tomorrow.  I discussed this with the [facility] resident who agreed with the plan.  The patient remained  stable while in the emergency department.    DISPOSITION:  Patient will be admitted to Dr. Rodriguez ([facility]) in stable condition.    FINAL IMPRESSION  1. Lung nodules    2. Facial cellulitis          Norma OWENS (Scribe), am scribing for, and in the presence of, Leyla Lopez D.O..    Electronically signed by: Norma Fuentes (Scribe), 11/28/2018    ILeyla D.O. personally performed the services described in this documentation, as scribed by Norma Fuentes in my presence, and it is both accurate and complete. C    The note accurately reflects work and decisions made by me.  Leyla Lopez  11/29/2018  3:23 AM

## 2018-11-29 NOTE — CARE PLAN
Problem: Infection  Goal: Will remain free from infection  Outcome: PROGRESSING AS EXPECTED  Per patient pain and swelling has decreased. Pt on 2 PO antibiotics. Vitals and labs stable. Pt on IV Fluids.     Problem: Knowledge Deficit  Goal: Knowledge of disease process/condition, treatment plan, diagnostic tests, and medications will improve  Outcome: PROGRESSING AS EXPECTED  Pt for CT of chest, discussed and IV contrast consent signed. Follow-up with results.

## 2018-11-29 NOTE — ED NOTES
Patient states that he has been on amoxicillin since Monday from his PCP. Pain 2/10 to right face. Denies chest pain or SOB.

## 2018-11-29 NOTE — SENIOR ADMIT NOTE
"Senior Admit Note    Pertinent History  Benito Davis is a 58 y.o. male with a history of sialolithiasis, HTN, DLD, GERD, nephrolithiasis, bradycardia during sleep, who [facility].    [facility]  Patient presented to the ED today for 5 days of swelling and redness at his right lower jaw towards his ear, associated with subjective fever and chills. Had been taking Augmentin for two days with improvement. Patient stated he thought it could be related to salivary stones that he has had before. Denies chest pain, dyspnea, cough, drooling, choking. States he feels like his normal self other than mild tenderness of his face. Denies asthma history. Denies cancer history.    CT soft tissue neck w/ was done, Radiology impression: \"1. Right lower facial cellulitis 2. Multiple bilateral upper lobe pulmonary nodules, one of which is cavitary. Consider metastatic disease and septic emboli.\" No prior imaging of lungs available from [facility], only other imaging was a CT head w/o on 10/08/14. Patient's place of residency has changed a lot and he does not think he has had previous chest imaging before.    Labs included procalcitonin 0.36, lactic acid 1.1, and bland CBC and CMP. Quantiferon-TB from 5/15/16 and 10/4/18 both [facility].    Patient received ceftriaxone 2gm IV once 11/28/18 at 13:58. He was transferred to [facility] \"since he is not a  and he is medically stable we have arranged for transfer to return now [[facility]] for further workup patient is stable upon transfer.\"    Pertinent Social History  Denies IVDU. Previous tobacco user, 4 years 0.5 PPD in college. Previously lived in [place] but has had multiple negative TB tests in the past. 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 Was previously a .    [facility] ED Course  Patient presented with normal vitals, had a bland CBC, CMP showing mild gap, mild glucose elevation, mild " ALP elevation, globulin elevated, troponin negative, BNP normal range, CXR no acute findings, blood cultures were drawn.    Most recently recorded vitals  Blood Pressure: 138/83, NIBP: (!) 163/75, NIBP MAP (Calculated): 140, Heart Rate (Monitored): 89, Pulse: 88, Respiration: 15, Temperature: 36.8 °C (98.2 °F), Weight: 97.3 kg (214 lb 8.1 oz), Pulse Oximetry: 95 %, O2 (LPM): 0, O2 Delivery: None (Room Air)    Exam  General: No acute distress. Well-appearing.  Eyes: Extraocular movements grossly intact.  Throat: No erythema or exudates noted.  Neck: Supple. Normal range of motion. Firm mildly tender 2-cm nodule at right mandibular angle with surrounding erythema without purulence or discharge. No other cervical nodules or lymphadenopathy appreciated  Lungs: No respiratory distress. No wheezing or rales. No significant rhonchi.  Cardiovascular: Regular rate. Regular rhythm.  Abdomen: Soft, non-tender, non-distended.  Extremities: No cyanosis. No edema.  Neurological: Alert and oriented.  Psychiatric: Normal mood and affect.    Assessment  Facial cellulitis  Incidental lung nodules  History of sialolithiasis  History of hypertension  Healthcare worker  Other chronic problems per HPI    No abscess on CT w/. At risk for MRSA due to healthcare work. Cannot do further contrast imaging today as patient just had a contrast study. Patient does not appear systemically infected. Needs workup for lung nodules. Clinical picture not consistent with TB, also had recent negative quantiferon.    Plan  Continue Augmentin (Strep, anaerobes), add Bactrim (MRSA)  Follow [facility]  Follow up [facility] blood cultures  HIV screen  IVF for contrast nephropathy prevention  CT chest w/wo tomorrow  Requested nurse take pictures of cellulitis and [facility] to [facility]  Pulmonology consult tomorrow after imaging results    DVT prophylaxis: Lovenox  Diet: Regular    Please see Dr. Carver's H&P for full detals

## 2018-11-29 NOTE — PROGRESS NOTES
"Pt arrived to room at 2315  A+ox4. POOLE. Steady gait. Oriented to room. Call light within reach. No distress. VSS Blood pressure 117/63, pulse 80, temperature 36.8 °C (98.2 °F), temperature source Temporal, resp. rate 16, height 1.803 m (5' 11\"), weight 96.6 kg (212 lb 15.4 oz), SpO2 100 %.    Fall precautions in place. Will monitor and continue plan of care.   "

## 2018-11-29 NOTE — CARE PLAN
Problem: Safety  Goal: Will remain free from injury  Outcome: PROGRESSING AS EXPECTED  Fall precautions in place. Call light within reach

## 2018-11-29 NOTE — ASSESSMENT & PLAN NOTE
- Inc[facility]  - Transferred here for follow up  - Could not get another imaging with contrast on admission since got one in the last 24 hours  - Negative Quantiferon  Plan:  - IVF for contrast nephropathy prevention  - CT chest w/wo contrast tomorrow  - Day team to consult Pulmonology after imaging results

## 2018-11-29 NOTE — PROGRESS NOTES
2 RN skin check completed. Pt has swelling to red check. Picture taken , physician aware. No other skin  Issues noted.

## 2018-11-29 NOTE — H&P
"      Internal Medicine Admitting History and Physical    Note Author: Jace Carver M.D.       Name Benito Davis 1960   Age/Sex 58 y.o. male   MRN [id number]   Code Status FULL     After 5PM or if no immediate response to page, please call for cross-coverage  Attending/Team: Dr. Pringle/Jeremy See Patient List for primary contact information  Call [phone] to page    1st Call - Day Intern (R1):   Dr. Archibald 2nd Call - Day Sr. Resident (R2/R3):   Dr. Melchor       Chief Complaint:   Swelling on rt jaw and [facility]    HPI:  Patient is a 57 y/o male with a PMH of HTN, HLD, GERD, sialolithiasis, nephrolithiasis, hypoxemia and bradycardia during sleep [facility], redness and pain on his rt jaw since 5 days and incidental finding of multiple lung nodules bilaterally when CT soft tissue of neck was done.   Patient was prescribed Augmentin for his swelling which has improved in the last two days with antibiotics. Pain is 2-3/10 now and there is mild erythema. Denies dysphagia, pain on chewing and swallowing, pain down his neck, limitation of range of motion of jaw and neck. He measured a temp of 99.8 when this swelling started.  CT neck showed \"1. Right lower facial cellulitis 2. Multiple bilateral upper lobe pulmonary nodules, one of which is cavitary. Consider metastatic disease and septic emboli.\"  Patient denies any respiratory symptoms today. No h/o chest pain, SOB, cough, congestion, weight loss, night sweats, fever, chills, nausea, vomiting, abdominal pain, changes in bowel or urinary habits. Denies ever being diagnosed with cancer.  Patient states [facility] and was [facility]. Last one was on 10/4/18.  Patient lived and worked in [place] for about 7 years when he was young. 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 He " used to smoke 1/2 ppd x 4 years from [age] years of age; quit after that. Occasional alcohol use and smoking cannabis.  He reports that he was told his thyroid function tests were off but was never followed up; requests to have them done while inpatient here.  He was worked up for sleep apnea and PFTs were also done per patient and everything was normal.  In the ED, vitals were stable.   Labs: procalcitonin 0.18, lactic acid 1.5, WBC 10.8  [facility] and was [facility] here for further workup.    Review of Systems   Constitutional: Negative for chills, fever, malaise/fatigue and weight loss.   HENT: Negative for congestion and sinus pain.    Eyes: Negative for blurred vision and redness.   Respiratory: Negative for cough, sputum production, shortness of breath and stridor.    Cardiovascular: Negative for chest pain, palpitations and leg swelling.   Gastrointestinal: Negative for abdominal pain, nausea and vomiting.   Genitourinary: Negative for dysuria and urgency.   Musculoskeletal: Negative for falls, joint pain and neck pain.   Skin: Negative for itching and rash.   Neurological: Negative for speech change and headaches.   Psychiatric/Behavioral: Negative for depression. The patient is nervous/anxious.    He is anxious since being told in the ED about possibility of malignancy.          Past Medical History (Chronic medical problem, known complications and current treatment)    HTN, HLD, GERD, sialolithiasis, nephrolithiasis, hypoxemia and bradycardia during sleep    Past Surgical History:  Past Surgical History:   Procedure Laterality Date   • CYSTOSCOPY  10/2/2015    Procedure: CYSTOSCOPY ;  Surgeon: Zaki Fitzgerald M.D.;  Location: [facility];  Service:    • URETEROSCOPY Right 10/2/2015    Procedure: URETEROSCOPY;  Surgeon: Zaki Fitzgerald M.D.;  Location: [facility];  Service:    • LASERTRIPSY  10/2/2015    Procedure:  LASER LITHOTRIPSY;  Surgeon: Zaki MONTOYA  DALJIT Fitzgerald;  Location: [facility];  Service:    • STENT PLACEMENT Left 10/2/2015    Procedure: STENT PLACEMENT;  Surgeon: Zaki Fitzgerald M.D.;  Location: SUR[facility]ERY [facility];  Service:    • OTHER  2012    ganglion cyst removal Left wrist   • OTHER ABDOMINAL SURGERY  1990    appendectomy       Current Outpatient Medications:  Home Medications     Reviewed by Steve Francois (Pharmacy Tech) on 11/28/18 at 2154  Med List Status: Complete   Medication Last Dose Status   Amoxicillin-Pot Clavulanate (AUGMENTIN PO) 11/28/2018 Active   aspirin EC (ECOTRIN) 81 MG Tablet Delayed Response 11/27/2018 Active   CefTRIAXone Sodium (ROCEPHIN IV) 11/28/2018 Active   lisinopril (PRINIVIL) 20 MG Tab 11/28/2018 Active   ranitidine (ZANTAC) 75 MG tablet 11/27/2018 Active   simvastatin (ZOCOR) 40 MG Tab 11/27/2018 Active                Medication Allergy/Sensitivities:  Allergies   Allergen Reactions   • Codeine Rash         Family History (mandatory)   Family History   Problem Relation Age of Onset   • No Known Problems Mother    • Heart Disease Father    • Heart Disease Step-Brother    • Lung Disease Maternal Grandmother        Social History (mandatory)   Social History     Social History   • Marital status: Single     Spouse name: N/A   • Number of children: N/A   • Years of education: N/A     Occupational History   • Not on file.     Social History Main Topics   • Smoking status: Former Smoker     Packs/day: 0.50     Years: 4.00     Types: Cigarettes     Quit date: 10/20/1983   • Smokeless tobacco: Never Used   • Alcohol use Yes      Comment: six pack a month   • Drug use: No   • Sexual activity: Not on file     Other Topics Concern   • Not on file     Social History Narrative   • No narrative on file     Living situation: Lives alone  PCP : GÓMEZ Chu    Physical Exam     Vitals:    11/28/18 2202 11/28/18 2358 11/29/18 0000 11/29/18 0400   BP: 132/72  147/89 117/63   Pulse: 78  86 80   Resp: 16  " 18 16   Temp:   37.4 °C (99.3 °F) 36.8 °C (98.2 °F)   TempSrc:   Temporal Temporal   SpO2: 96%  92% 100%   Weight:   96.6 kg (212 lb 15.4 oz)    Height:  1.803 m (5' 11\")       Body mass index is 29.7 kg/m².  /63   Pulse 80   Temp 36.8 °C (98.2 °F) (Temporal)   Resp 16   Ht 1.803 m (5' 11\")   Wt 96.6 kg (212 lb 15.4 oz)   SpO2 100%   BMI 29.70 kg/m²   O2 therapy: Pulse Oximetry: 100 %, O2 (LPM): 0, O2 Delivery: None (Room Air)    Physical Exam   Constitutional: He is oriented to person, place, and time and well-developed, well-nourished, and in no distress.   HENT:   Head: Normocephalic and atraumatic.   Mouth/Throat: Oropharynx is clear and moist.   2cm x 2cm swelling on rt jaw. Mobile and mild erythema   Eyes: Pupils are equal, round, and reactive to light. No scleral icterus.   Neck: Normal range of motion. Neck supple.   Cardiovascular: Normal rate, regular rhythm and normal heart sounds.    No murmur heard.  Pulmonary/Chest: Effort normal and breath sounds normal. No stridor. No respiratory distress.   Abdominal: Soft. Bowel sounds are normal. He exhibits no distension. There is no tenderness.   Musculoskeletal: Normal range of motion. He exhibits no edema or tenderness.   Lymphadenopathy:     He has no cervical adenopathy.   Neurological: He is alert and oriented to person, place, and time.   Skin: Skin is warm. No rash noted.         Data Review       Old Records Request:   Completed  Current Records review/summary: Completed    Lab Data Review:  Recent Results (from the past 24 hour(s))   EKG (NOW)    Collection Time: 18  6:40 PM   Result Value Ref Range    Report       [facility] Emergency Dept.    Test Date:  2018  Pt Name:    TAMY BARRIENTOS                 Department: ER  MRN:        [id number]                      Room:       [facility]  Gender:     Male                         Technician: GEETA  :        1960                   Requested By:BAM EVANS  Order #:  "   [id number]                    Reading MD: Leyla Lopez    Measurements  Intervals                                Axis  Rate:       86                           P:          32  NV:         172                          QRS:        46  QRSD:       82                           T:          -2  QT:         348  QTc:        417    Interpretive Statements  SINUS RHYTHM  BORDERLINE T ABNORMALITIES, INFERIOR LEADS  Compared to ECG 09/25/2015 12:53:02  Borderline T-wave abnormality now present    Electronically Signed On 11- 18:52:25 PST by Leyla Lopez     CBC WITH DIFFERENTIAL    Collection Time: 11/28/18  6:42 PM   Result Value Ref Range    WBC 10.8 4.8 - 10.8 K/uL    RBC 5.04 4.70 - 6.10 M/uL    Hemoglobin 15.6 14.0 - 18.0 g/dL    Hematocrit 46.0 42.0 - 52.0 %    MCV 91.3 81.4 - 97.8 fL    MCH 31.0 27.0 - 33.0 pg    MCHC 33.9 33.7 - 35.3 g/dL    RDW 41.9 35.9 - 50.0 fL    Platelet Count 261 164 - 446 K/uL    MPV 9.0 9.0 - 12.9 fL    Neutrophils-Polys 70.80 44.00 - 72.00 %    Lymphocytes 18.00 (L) 22.00 - 41.00 %    Monocytes 10.40 0.00 - 13.40 %    Eosinophils 0.10 0.00 - 6.90 %    Basophils 0.40 0.00 - 1.80 %    Immature Granulocytes 0.30 0.00 - 0.90 %    Nucleated RBC 0.00 /100 WBC    Neutrophils (Absolute) 7.67 (H) 1.82 - 7.42 K/uL    Lymphs (Absolute) 1.95 1.00 - 4.80 K/uL    Monos (Absolute) 1.12 (H) 0.00 - 0.85 K/uL    Eos (Absolute) 0.01 0.00 - 0.51 K/uL    Baso (Absolute) 0.04 0.00 - 0.12 K/uL    Immature Granulocytes (abs) 0.03 0.00 - 0.11 K/uL    NRBC (Absolute) 0.00 K/uL   COMP METABOLIC PANEL    Collection Time: 11/28/18  6:42 PM   Result Value Ref Range    Sodium 140 135 - 145 mmol/L    Potassium 3.9 3.6 - 5.5 mmol/L    Chloride 104 96 - 112 mmol/L    Co2 24 20 - 33 mmol/L    Anion Gap 12.0 (H) 0.0 - 11.9    Glucose 124 (H) 65 - 99 mg/dL    Bun 12 8 - 22 mg/dL    Creatinine 0.92 0.50 - 1.40 mg/dL    Calcium 9.5 8.5 - 10.5 mg/dL    AST(SGOT) 24 12 - 45 U/L    ALT(SGPT) 34 2 - 50 U/L    Alkaline Phosphatase  114 (H) 30 - 99 U/L    Total Bilirubin 0.7 0.1 - 1.5 mg/dL    Albumin 4.3 3.2 - 4.9 g/dL    Total Protein 8.1 6.0 - 8.2 g/dL    Globulin 3.8 (H) 1.9 - 3.5 g/dL    A-G Ratio 1.1 g/dL   TROPONIN    Collection Time: 11/28/18  6:42 PM   Result Value Ref Range    Troponin I <0.01 0.00 - 0.04 ng/mL   BTYPE NATRIURETIC PEPTIDE    Collection Time: 11/28/18  6:42 PM   Result Value Ref Range    B Natriuretic Peptide 2 0 - 100 pg/mL   LACTIC ACID    Collection Time: 11/28/18  6:42 PM   Result Value Ref Range    Lactic Acid 1.5 0.5 - 2.0 mmol/L   ESTIMATED GFR    Collection Time: 11/28/18  6:42 PM   Result Value Ref Range    GFR If African American >60 >60 mL/min/1.73 m 2    GFR If Non African American >60 >60 mL/min/1.73 m 2   BLOOD CULTURE,HOLD    Collection Time: 11/28/18  7:00 PM   Result Value Ref Range    Blood Culture Hold Collected    PROCALCITONIN    Collection Time: 11/28/18  7:35 PM   Result Value Ref Range    Procalcitonin 0.18 <0.25 ng/mL   PROTHROMBIN TIME    Collection Time: 11/28/18  7:35 PM   Result Value Ref Range    PT 13.8 12.0 - 14.6 sec    INR 1.05 0.87 - 1.13   APTT    Collection Time: 11/28/18  7:35 PM   Result Value Ref Range    APTT 31.0 24.7 - 36.0 sec   CBC with Differential    Collection Time: 11/29/18  3:23 AM   Result Value Ref Range    WBC 9.4 4.8 - 10.8 K/uL    RBC 4.52 (L) 4.70 - 6.10 M/uL    Hemoglobin 14.4 14.0 - 18.0 g/dL    Hematocrit 41.8 (L) 42.0 - 52.0 %    MCV 92.5 81.4 - 97.8 fL    MCH 31.9 27.0 - 33.0 pg    MCHC 34.4 33.7 - 35.3 g/dL    RDW 42.4 35.9 - 50.0 fL    Platelet Count 241 164 - 446 K/uL    MPV 9.2 9.0 - 12.9 fL    Neutrophils-Polys 57.40 44.00 - 72.00 %    Lymphocytes 24.50 22.00 - 41.00 %    Monocytes 16.80 (H) 0.00 - 13.40 %    Eosinophils 0.60 0.00 - 6.90 %    Basophils 0.40 0.00 - 1.80 %    Immature Granulocytes 0.30 0.00 - 0.90 %    Nucleated RBC 0.00 /100 WBC    Neutrophils (Absolute) 5.38 1.82 - 7.42 K/uL    Lymphs (Absolute) 2.30 1.00 - 4.80 K/uL    Monos (Absolute)  1.58 (H) 0.00 - 0.85 K/uL    Eos (Absolute) 0.06 0.00 - 0.51 K/uL    Baso (Absolute) 0.04 0.00 - 0.12 K/uL    Immature Granulocytes (abs) 0.03 0.00 - 0.11 K/uL    NRBC (Absolute) 0.00 K/uL   Comp Metabolic Panel (CMP)    Collection Time: 11/29/18  3:23 AM   Result Value Ref Range    Sodium 141 135 - 145 mmol/L    Potassium 4.3 3.6 - 5.5 mmol/L    Chloride 107 96 - 112 mmol/L    Co2 26 20 - 33 mmol/L    Anion Gap 8.0 0.0 - 11.9    Glucose 99 65 - 99 mg/dL    Bun 14 8 - 22 mg/dL    Creatinine 1.04 0.50 - 1.40 mg/dL    Calcium 8.7 8.5 - 10.5 mg/dL    AST(SGOT) 18 12 - 45 U/L    ALT(SGPT) 25 2 - 50 U/L    Alkaline Phosphatase 96 30 - 99 U/L    Total Bilirubin 0.5 0.1 - 1.5 mg/dL    Albumin 3.7 3.2 - 4.9 g/dL    Total Protein 6.8 6.0 - 8.2 g/dL    Globulin 3.1 1.9 - 3.5 g/dL    A-G Ratio 1.2 g/dL   HIV AG/AB COMBO ASSAY SCREENING    Collection Time: 11/29/18  3:23 AM   Result Value Ref Range    HIV Ag/Ab Combo Assay Non Reactive Non Reactive   Magnesium    Collection Time: 11/29/18  3:23 AM   Result Value Ref Range    Magnesium 2.1 1.5 - 2.5 mg/dL   ESTIMATED GFR    Collection Time: 11/29/18  3:23 AM   Result Value Ref Range    GFR If African American >60 >60 mL/min/1.73 m 2    GFR If Non African American >60 >60 mL/min/1.73 m 2       Imaging/Procedures Review:    Independant Imaging Review: Completed  CT-FOREIGN FILM CAT SCAN   Final Result      DX-CHEST-PORTABLE (1 VIEW)   Final Result      No acute cardiopulmonary abnormality. No radiographic evidence of pneumonia.               EKG:   EKG Independant Review: Completed  QTc:417, HR: 86, Normal Sinus Rhythm, borderline T wave abnormalities    Records reviewed and summarized in current documentation :  Yes  [employer] teaching service handout given to patient:  No         Assessment/Plan     * Multiple lung nodules on CT- (present on admission)   Assessment & Plan    - [facility]  - Transferred here for follow up  - Could not get  another imaging with contrast on admission since got one in the last 24 hours  - Negative Quantiferon  Plan:  - IVF for contrast nephropathy prevention  - CT chest w/wo contrast tomorrow  - Day team to consult Pulmonology after imaging results          Cellulitis of face- (present on admission)   Assessment & Plan    - 2cm x 2cm firm swelling on rt jaw, mild eryhtema, mildly tender  - Got 2 doses of Augmentin before he came in to [facility]  - 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[facility]  Plan:  - Admit for observation  - Aspiration and fall precautions  - Continue Augmentin and Bactrim started for added coverage since exposure as a surgical tech  - Blood cultures pending  - Lovenox for DVT prophylaxis  - Regular diet     Dyslipidemia- (present on admission)   Assessment & Plan    - Continue statins     HTN (hypertension)- (present on admission)   Assessment & Plan    - Continue lisinopril         Anticipated Hospital stay: Observation admit        Quality Measures  Quality-Core Measures   Reviewed items::  EKG reviewed, Labs reviewed, Medications reviewed and Radiology images reviewed  Crowell catheter::  No Crowell  DVT prophylaxis pharmacological::  Enoxaparin (Lovenox)    PCP: GÓMEZ Chu

## 2018-11-30 NOTE — DISCHARGE INSTRUCTIONS
Discharge Instructions    Discharged to home by car with friend. Discharged via walking, hospital escort: Yes.  Special equipment needed: Not Applicable    Be sure to schedule a follow-up appointment with your primary care doctor or any specialists as instructed.     Discharge Plan:   Influenza Vaccine Indication: Not indicated: Previously immunized this influenza season and > 8 years of age    I understand that a diet low in cholesterol, fat, and sodium is recommended for good health. Unless I have been given specific instructions below for another diet, I accept this instruction as my diet prescription.   Other diet: Regular    Special Instructions: None    · Is patient discharged on Warfarin / Coumadin?   No     Depression / Suicide Risk    As you are discharged from this [facility] facility, it is important to learn how to keep safe from harming yourself.    Recognize the warning signs:  · Abrupt changes in personality, positive or negative- including increase in energy   · Giving away possessions  · Change in eating patterns- significant weight changes-  positive or negative  · Change in sleeping patterns- unable to sleep or sleeping all the time   · Unwillingness or inability to communicate  · Depression  · Unusual sadness, discouragement and loneliness  · Talk of wanting to die  · Neglect of personal appearance   · Rebelliousness- reckless behavior  · Withdrawal from people/activities they love  · Confusion- inability to concentrate     If you or a loved one observes any of these behaviors or has concerns about self-harm, here's what you can do:  · Talk about it- your feelings and reasons for harming yourself  · Remove any means that you might use to hurt yourself (examples: pills, rope, extension cords, firearm)  · Get professional help from the community (Mental Health, Substance Abuse, psychological counseling)  · Do not be alone:Call your Safe Contact- someone whom you trust who will be there for  you.  · Call your local CRISIS HOTLINE 577-0572 or 162-919-6857  · Call your local Children's Mobile Crisis Response Team [place] (814) 578-7715 or www.[employer]  · Call the toll free National Suicide Prevention Hotlines   · National Suicide Prevention Lifeline 333-319-LCKE (2576)  · National Hope Line Network 800-SUICIDE (785-3305)      Cellulitis, Adult  Introduction  Cellulitis is a skin infection. The infected area is usually red and sore. This condition occurs most often in the arms and lower legs. It is very important to get treated for this condition.  Follow these instructions at home:  · Take over-the-counter and prescription medicines only as told by your doctor.  · If you were prescribed an antibiotic medicine, take it as told by your doctor. Do not stop taking the antibiotic even if you start to feel better.  · Drink enough fluid to keep your pee (urine) clear or pale yellow.  · Do not touch or rub the infected area.  · Raise (elevate) the infected area above the level of your heart while you are sitting or lying down.  · Place warm or cold wet cloths (warm or cold compresses) on the infected area. Do this as told by your doctor.  · Keep all follow-up visits as told by your doctor. This is important. These visits let your doctor make sure your infection is not getting worse.  Contact a doctor if:  · You have a fever.  · Your symptoms do not get better after 1-2 days of treatment.  · Your bone or joint under the infected area starts to hurt after the skin has healed.  · Your infection comes back. This can happen in the same area or another area.  · You have a swollen bump in the infected area.  · You have new symptoms.  · You feel ill and also have muscle aches and pains.  Get help right away if:  · Your symptoms get worse.  · You feel very sleepy.  · You throw up (vomit) or have watery poop (diarrhea) for a long time.  · There are red streaks coming from the infected area.  · Your red area  gets larger.  · Your red area turns darker.  This information is not intended to replace advice given to you by your health care provider. Make sure you discuss any questions you have with your health care provider.  Document Released: 06/05/2009 Document Revised: 05/25/2017 Document Reviewed: 10/26/2016  © 2017 Elsevier    Amoxicillin; Clavulanic Acid tablets  What is this medicine?  AMOXICILLIN; CLAVULANIC ACID (a mox i ADINA in; EVELIA hennessy ic AS id) is a penicillin antibiotic. It is used to treat certain kinds of bacterial infections. It will not work for colds, flu, or other viral infections.  This medicine may be used for other purposes; ask your health care provider or pharmacist if you have questions.  COMMON BRAND NAME(S): Augmentin  What should I tell my health care provider before I take this medicine?  They need to know if you have any of these conditions:  -bowel disease, like colitis  -kidney disease  -liver disease  -mononucleosis  -an unusual or allergic reaction to amoxicillin, penicillin, cephalosporin, other antibiotics, clavulanic acid, other medicines, foods, dyes, or preservatives  -pregnant or trying to get pregnant  -breast-feeding  How should I use this medicine?  Take this medicine by mouth with a full glass of water. Follow the directions on the prescription label. Take at the start of a meal. Do not crush or chew. If the tablet has a score line, you may cut it in half at the score line for easier swallowing. Take your medicine at regular intervals. Do not take your medicine more often than directed. Take all of your medicine as directed even if you think you are better. Do not skip doses or stop your medicine early.  Talk to your pediatrician regarding the use of this medicine in children. Special care may be needed.  Overdosage: If you think you have taken too much of this medicine contact a poison control center or emergency room at once.  NOTE: This medicine is only for you. Do not  share this medicine with others.  What if I miss a dose?  If you miss a dose, take it as soon as you can. If it is almost time for your next dose, take only that dose. Do not take double or extra doses.  What may interact with this medicine?  -allopurinol  -anticoagulants  -birth control pills  -methotrexate  -probenecid  This list may not describe all possible interactions. Give your health care provider a list of all the medicines, herbs, non-prescription drugs, or dietary supplements you use. Also tell them if you smoke, drink alcohol, or use illegal drugs. Some items may interact with your medicine.  What should I watch for while using this medicine?  Tell your doctor or health care professional if your symptoms do not improve.  Do not treat diarrhea with over the counter products. Contact your doctor if you have diarrhea that lasts more than 2 days or if it is severe and watery.  If you have diabetes, you may get a false-positive result for sugar in your urine. Check with your doctor or health care professional.  Birth control pills may not work properly while you are taking this medicine. Talk to your doctor about using an extra method of birth control.  What side effects may I notice from receiving this medicine?  Side effects that you should report to your doctor or health care professional as soon as possible:  -allergic reactions like skin rash, itching or hives, swelling of the face, lips, or tongue  -breathing problems  -dark urine  -fever or chills, sore throat  -redness, blistering, peeling or loosening of the skin, including inside the mouth  -seizures  -trouble passing urine or change in the amount of urine  -unusual bleeding, bruising  -unusually weak or tired  -white patches or sores in the mouth or throat  Side effects that usually do not require medical attention (report to your doctor or health care professional if they continue or are bothersome):  -diarrhea  -dizziness  -headache  -nausea,  vomiting  -stomach upset  -vaginal or anal irritation  This list may not describe all possible side effects. Call your doctor for medical advice about side effects. You may report side effects to FDA at 9-374-JLN-7984.  Where should I keep my medicine?  Keep out of the reach of children.  Store at room temperature below 25 degrees C (77 degrees F). Keep container tightly closed. Throw away any unused medicine after the expiration date.  NOTE: This sheet is a summary. It may not cover all possible information. If you have questions about this medicine, talk to your doctor, pharmacist, or health care provider.  © 2018 Elsevier/Gold Standard (2009-03-12 12:04:30)

## 2018-11-30 NOTE — PROGRESS NOTES
Pt D/C'd home. Gave pt prescriptions, discharge education and instructions and went over. All questions answered. RN walked pt to care.

## 2018-12-03 LAB
BACTERIA BLD CULT: NORMAL
BACTERIA BLD CULT: NORMAL
SIGNIFICANT IND 70042: NORMAL
SIGNIFICANT IND 70042: NORMAL
SITE SITE: NORMAL
SITE SITE: NORMAL
SOURCE SOURCE: NORMAL
SOURCE SOURCE: NORMAL

## 2018-12-04 LAB
BACTERIA BLD CULT: NORMAL
SIGNIFICANT IND 70042: NORMAL
SITE SITE: NORMAL
SOURCE SOURCE: NORMAL

## 2018-12-10 NOTE — ADDENDUM NOTE
Encounter addended by: Mignon Moss, Med Ass't on: 12/10/2018 10:58 AM<BR>    Actions taken: Letter status changed

## 2018-12-11 ENCOUNTER — OFFICE VISIT (OUTPATIENT)
Dept: PULMONOLOGY | Facility: HOSPICE | Age: 58
End: 2018-12-11
Payer: COMMERCIAL

## 2018-12-11 VITALS
SYSTOLIC BLOOD PRESSURE: 124 MMHG | DIASTOLIC BLOOD PRESSURE: 70 MMHG | BODY MASS INDEX: 29.12 KG/M2 | TEMPERATURE: 97.2 F | OXYGEN SATURATION: 97 % | WEIGHT: 208 LBS | RESPIRATION RATE: 16 BRPM | HEIGHT: 71 IN | HEART RATE: 88 BPM

## 2018-12-11 DIAGNOSIS — L03.211 CELLULITIS OF FACE: ICD-10-CM

## 2018-12-11 DIAGNOSIS — R91.8 PULMONARY NODULES: ICD-10-CM

## 2018-12-11 PROCEDURE — 99214 OFFICE O/P EST MOD 30 MIN: CPT | Performed by: INTERNAL MEDICINE

## 2018-12-11 NOTE — PROGRESS NOTES
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  He was recently hospitalized November 20, 2018 for cellulitis of the right jaw.  He had noted right lower jaw edema and low-grade fevers in the days preceding hospitalization.  He was treated with Augmentin for 10 days with resolution of symptoms.  He denies cough, dyspnea, wheezing, further fevers or any weight loss.  At time of his neck CAT scan there was incidental finding of pulmonary nodules.  Subsequent chest CAT scan with contrast November 29, 2018 was reviewed and showed bilateral, scattered pulmonary nodules, the largest measuring 1.5 cm in the left upper lobe with cavitation.  No significant infiltrates or gross lymphadenopathy was appreciated.  The patient has <5 PK YRs of smoking remotely.  [facility].  He works with patients and denies known TB contacts however assists during EBUS/bronchoscopy procedures.  He denies any past pulmonary history.  He lived in [place] in the past and was treated with 9 months of INH for +PPD test, although subsequent QuantiFERON TB Gold tests have been negative. Denies IVDU.  He had a cardiac CT scan in July 2018 which was also reviewed-available chest images showed no nodules.      Past Medical History:   Diagnosis Date   • Fever    • GERD (gastroesophageal reflux disease)    • Heart burn    • Heartburn    • Hypertension    • Indigestion    • Kidney stone    • Renal disorder     stones       Social History     Social History   • Marital status: Single     Spouse name: N/A   • Number of children: N/A   • Years of education: N/A     Occupational History   • Not on file.     Social History Main Topics   • Smoking status: Former Smoker     Packs/day: 0.50     Years: 4.00     Types: Cigarettes     Quit date: 10/20/1985   •  Smokeless tobacco: Never Used   • Alcohol use Yes      Comment: six pack a month   • Drug use: Yes     Types: Marijuana   • Sexual activity: Not on file     Other Topics Concern   • Not on file     Social History Narrative   • No narrative on file       Family History   Problem Relation Age of Onset   • No Known Problems Mother    • Heart Disease Father         Passed at 49 from MI   • Heart Disease Step-Brother    • Lung Disease Maternal Grandmother        Current Outpatient Prescriptions on File Prior to Visit   Medication Sig Dispense Refill   • simvastatin (ZOCOR) 40 MG Tab Take 1 Tab by mouth every evening. 90 Tab 3   • lisinopril (PRINIVIL) 20 MG Tab Take 1 Tab by mouth every day. 90 Tab 3   • aspirin EC (ECOTRIN) 81 MG Tablet Delayed Response Take 81 mg by mouth every bedtime.     • ranitidine (ZANTAC) 75 MG tablet Take 35 mg by mouth every evening.     • acetaminophen (TYLENOL) 325 MG Tab Take 2 Tabs by mouth every 6 hours as needed (Mild Pain; (Pain scale 1-3); Temp greater than 100.5 F). (Patient not taking: Reported on 12/11/2018) 30 Tab 0   • amoxicillin-clavulanate (AUGMENTIN) 875-125 MG Tab Take 1 Tab by mouth 2 times a day. (Patient not taking: Reported on 12/11/2018) 12 Tab 0     No current facility-administered medications on file prior to visit.        Allergies: Codeine    ROS:   Constitutional: +low grade fevers, denies chills, night sweats, fatigue or weight loss  Eyes: Denies vision loss, pain, drainage, double vision  Ears, Nose, Throat: Denies earache, difficulty hearing, tinnitus, nasal congestion, hoarseness  Cardiovascular: Denies chest pain, tightness, palpitations, orthopnea or edema  Respiratory: Denies shortness of breath, cough, wheezing, hemoptysis  Sleep: Denies daytime sleepiness, snoring, apneas, insomnia, morning headaches  GI: +heartburn, denies dysphagia, nausea, abdominal pain, diarrhea or constipation  : Denies frequent urination, hematuria, discharge or painful  "urination  Musculoskeletal: Denies back pain, painful joints, sore muscles  Neurological: Denies weakness or headaches  Skin: No rashes    Blood pressure 124/70, pulse 88, temperature 36.2 °C (97.2 °F), temperature source Temporal, resp. rate 16, height 1.803 m (5' 11\"), weight 94.3 kg (208 lb), SpO2 97 %.    Physical Exam:  Appearance: Well-nourished, well-developed, in no acute distress  HEENT: Normocephalic, atraumatic, white sclera, PERRLA, oropharynx clear  Neck: No adenopathy or masses  Respiratory: no intercostal retractions or accessory muscle use  Lungs auscultation: Clear to auscultation bilaterally  Cardiovascular: Regular rate rhythm. No murmurs, rubs or gallops.  No LE edema  Abdomen: soft, nondistended  Gait: Normal  Digits: No clubbing, cyanosis  Motor: No focal deficits  Orientation: Oriented to time, person and place    Diagnosis:  1. Pulmonary nodules  QUANTIFERON TB GOLD (IN TUBE)    CT-CHEST (THORAX) W/O   2. Cellulitis of face         Plan:  The patient has incidental finding of scattered, bilateral pulmonary nodules in the setting of recent right facial cellulitis.  These are suspected infectious/inflammatory vs septic emboli,  less likely malignant.  His cardiac CT in July 2018 did not identify any obvious nodules on available chest images.  His cellulitis has resolved following treatment with Augmentin for 10 days.  He denies any respiratory or constitutional symptomatology at this time.  He has no malignancy risk factors.  Concur with choice of antibiotic, and recommend follow-up chest CAT scan without contrast in 6 weeks to reassess-if the nodules do not resolve then needle biopsy of a subpleural nodule would be recommended.  Also recommend updating QuantiFERON TB Gold in the event that he has had TB exposure within the last 3 months.  Return in about 7 weeks (around 1/29/2019) for at lung nodule clinic.      "

## 2019-01-28 ENCOUNTER — HOSPITAL ENCOUNTER (OUTPATIENT)
Dept: RADIOLOGY | Facility: MEDICAL CENTER | Age: 59
End: 2019-01-28
Attending: INTERNAL MEDICINE
Payer: COMMERCIAL

## 2019-01-28 DIAGNOSIS — R91.8 PULMONARY NODULES: ICD-10-CM

## 2019-01-28 PROCEDURE — 71250 CT THORAX DX C-: CPT

## 2019-01-29 ENCOUNTER — OFFICE VISIT (OUTPATIENT)
Dept: PULMONOLOGY | Facility: HOSPICE | Age: 59
End: 2019-01-29
Payer: COMMERCIAL

## 2019-01-29 VITALS
DIASTOLIC BLOOD PRESSURE: 70 MMHG | HEART RATE: 81 BPM | HEIGHT: 71 IN | WEIGHT: 213.2 LBS | OXYGEN SATURATION: 100 % | RESPIRATION RATE: 20 BRPM | TEMPERATURE: 97.3 F | SYSTOLIC BLOOD PRESSURE: 130 MMHG | BODY MASS INDEX: 29.85 KG/M2

## 2019-01-29 DIAGNOSIS — R91.8 PULMONARY NODULES: ICD-10-CM

## 2019-01-29 PROCEDURE — 99214 OFFICE O/P EST MOD 30 MIN: CPT | Performed by: INTERNAL MEDICINE

## 2019-01-29 NOTE — PROGRESS NOTES
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  He was hospitalized November 20, 2018 for cellulitis of the right jaw.  He had noted right lower jaw edema and low-grade fevers in the days preceding hospitalization.  He was treated with Augmentin for 10 days with resolution of symptoms.  He denies cough, dyspnea, wheezing, further fevers or any weight loss.  At time of his neck CAT scan there was incidental finding of pulmonary nodules.  Subsequent chest CAT scan with contrast November 29, 2018 was reviewed and showed bilateral, scattered pulmonary nodules, the largest measuring 1.5 cm in the left upper lobe with cavitation. No infiltrates or gross lymphadenopathy was appreciated.  The patient has <5 PK YRs of smoking remotely.  His QuantiFERON TB Gold test is negative. He lived in [place] in the past and was treated with 9 months of INH for +PPD test, although subsequent QuantiFERON TB Gold tests have been negative. Denies IVDU.  Follow-up chest CAT scan on January 28, 2019 was personally reviewed with the patient and showed interval resolution of the majority of nodules, however persistent subpleural left upper lobe nodule measuring 1.5 x 1.2 cm, now with no cavitation.    Past Medical History:   Diagnosis Date   • Fever    • GERD (gastroesophageal reflux disease)    • Heart burn    • Heartburn    • Hypertension    • Indigestion    • Kidney stone    • Renal disorder     stones       Social History     Social History   • Marital status: Single     Spouse name: N/A   • Number of children: N/A   • Years of education: N/A     Occupational History   • Not on file.     Social History Main Topics   • Smoking status: Former Smoker     Packs/day: 0.50     Years: 4.00     Types: Cigarettes     Quit date: 10/20/1985   • Smokeless tobacco: Never Used   • Alcohol use Yes      Comment: six pack a month   •  Drug use: Yes     Types: Marijuana   • Sexual activity: Not on file     Other Topics Concern   • Not on file     Social History Narrative   • No narrative on file       Family History   Problem Relation Age of Onset   • No Known Problems Mother    • Heart Disease Father         Passed at 49 from MI   • Heart Disease Step-Brother    • Lung Disease Maternal Grandmother        Current Outpatient Prescriptions on File Prior to Visit   Medication Sig Dispense Refill   • acetaminophen (TYLENOL) 325 MG Tab Take 2 Tabs by mouth every 6 hours as needed (Mild Pain; (Pain scale 1-3); Temp greater than 100.5 F). 30 Tab 0   • simvastatin (ZOCOR) 40 MG Tab Take 1 Tab by mouth every evening. 90 Tab 3   • lisinopril (PRINIVIL) 20 MG Tab Take 1 Tab by mouth every day. 90 Tab 3   • aspirin EC (ECOTRIN) 81 MG Tablet Delayed Response Take 81 mg by mouth every bedtime.     • ranitidine (ZANTAC) 75 MG tablet Take 35 mg by mouth every evening.     • amoxicillin-clavulanate (AUGMENTIN) 875-125 MG Tab Take 1 Tab by mouth 2 times a day. (Patient not taking: Reported on 1/29/2019) 12 Tab 0     No current facility-administered medications on file prior to visit.        Allergies: Codeine    ROS:   Constitutional: Denies fevers, chills, night sweats, fatigue or weight loss  Eyes: Denies vision loss, pain, drainage, double vision  Ears, Nose, Throat: Denies earache, difficulty hearing, tinnitus, nasal congestion, hoarseness  Cardiovascular: Denies chest pain, tightness, palpitations, orthopnea or edema  Respiratory: Denies shortness of breath, cough, wheezing, hemoptysis  Sleep: Denies daytime sleepiness, snoring, apneas, insomnia, morning headaches  GI: Denies heartburn, dysphagia, nausea, abdominal pain, diarrhea or constipation  : Denies frequent urination, hematuria, discharge or painful urination  Musculoskeletal: Denies back pain, painful joints, sore muscles  Neurological: Denies weakness or headaches  Skin: No rashes    Blood pressure  "130/70, pulse 81, temperature 36.3 °C (97.3 °F), temperature source Temporal, resp. rate 20, height 1.803 m (5' 11\"), weight 96.7 kg (213 lb 3.2 oz), SpO2 100 %.    Physical Exam:  Appearance: Well-nourished, well-developed, in no acute distress  HEENT: Normocephalic, atraumatic, white sclera, PERRLA, oropharynx clear  Neck: No adenopathy or masses  Respiratory: no intercostal retractions or accessory muscle use  Lungs auscultation: Clear to auscultation bilaterally  Cardiovascular: Regular rate rhythm. No murmurs, rubs or gallops.  No LE edema  Abdomen: soft, nondistended  Gait: Normal  Digits: No clubbing, cyanosis  Motor: No focal deficits  Orientation: Oriented to time, person and place    Diagnosis:  1. Pulmonary nodules  CT-NEEDLE BX-LUNG-MEDIASTINUM LEFT       Plan:  The patient's chest CAT scan shows interval resolution of the majority of nodules, consistent with inflammatory lesions.  The largest subpleural left upper lobe nodule however, persists albeit it is no longer cavitary.  He remains asymptomatic from both respiratory and constitutional standpoint.  Recommend CT-guided needle biopsy for further evaluation.  The patient concurred with biopsy.  Return in about 4 weeks (around 2/26/2019).      "

## 2019-02-04 ENCOUNTER — APPOINTMENT (OUTPATIENT)
Dept: ADMISSIONS | Facility: MEDICAL CENTER | Age: 59
End: 2019-02-04
Attending: INTERNAL MEDICINE
Payer: COMMERCIAL

## 2019-02-04 DIAGNOSIS — Z01.811 PRE-OPERATIVE RESPIRATORY EXAMINATION: ICD-10-CM

## 2019-02-04 DIAGNOSIS — Z01.810 PRE-OPERATIVE CARDIOVASCULAR EXAMINATION: ICD-10-CM

## 2019-02-04 DIAGNOSIS — Z01.812 PRE-OPERATIVE LABORATORY EXAMINATION: ICD-10-CM

## 2019-02-04 LAB
INR PPP: 1 (ref 0.87–1.13)
PLATELET # BLD AUTO: 247 K/UL (ref 164–446)
PROTHROMBIN TIME: 13.3 SEC (ref 12–14.6)

## 2019-02-04 PROCEDURE — 85610 PROTHROMBIN TIME: CPT

## 2019-02-04 PROCEDURE — 36415 COLL VENOUS BLD VENIPUNCTURE: CPT

## 2019-02-04 PROCEDURE — 85049 AUTOMATED PLATELET COUNT: CPT

## 2019-02-07 ENCOUNTER — APPOINTMENT (OUTPATIENT)
Dept: RADIOLOGY | Facility: MEDICAL CENTER | Age: 59
End: 2019-02-07
Attending: RADIOLOGY
Payer: COMMERCIAL

## 2019-02-07 ENCOUNTER — HOSPITAL ENCOUNTER (OUTPATIENT)
Facility: MEDICAL CENTER | Age: 59
End: 2019-02-07
Attending: INTERNAL MEDICINE | Admitting: INTERNAL MEDICINE
Payer: COMMERCIAL

## 2019-02-07 ENCOUNTER — APPOINTMENT (OUTPATIENT)
Dept: RADIOLOGY | Facility: MEDICAL CENTER | Age: 59
End: 2019-02-07
Attending: INTERNAL MEDICINE
Payer: COMMERCIAL

## 2019-02-07 VITALS
TEMPERATURE: 98 F | HEIGHT: 71 IN | WEIGHT: 218.26 LBS | RESPIRATION RATE: 14 BRPM | SYSTOLIC BLOOD PRESSURE: 119 MMHG | HEART RATE: 68 BPM | OXYGEN SATURATION: 97 % | BODY MASS INDEX: 30.56 KG/M2 | DIASTOLIC BLOOD PRESSURE: 67 MMHG

## 2019-02-07 DIAGNOSIS — R91.8 PULMONARY NODULES: ICD-10-CM

## 2019-02-07 LAB
GRAM STN SPEC: ABNORMAL
PATHOLOGY CONSULT NOTE: NORMAL
RHODAMINE-AURAMINE STN SPEC: NORMAL
SIGNIFICANT IND 70042: ABNORMAL
SIGNIFICANT IND 70042: NORMAL
SITE SITE: ABNORMAL
SITE SITE: NORMAL
SOURCE SOURCE: ABNORMAL
SOURCE SOURCE: NORMAL

## 2019-02-07 PROCEDURE — 87116 MYCOBACTERIA CULTURE: CPT

## 2019-02-07 PROCEDURE — 700101 HCHG RX REV CODE 250

## 2019-02-07 PROCEDURE — 700111 HCHG RX REV CODE 636 W/ 250 OVERRIDE (IP)

## 2019-02-07 PROCEDURE — 700111 HCHG RX REV CODE 636 W/ 250 OVERRIDE (IP): Performed by: RADIOLOGY

## 2019-02-07 PROCEDURE — 87076 CULTURE ANAEROBE IDENT EACH: CPT

## 2019-02-07 PROCEDURE — 71045 X-RAY EXAM CHEST 1 VIEW: CPT

## 2019-02-07 PROCEDURE — 87205 SMEAR GRAM STAIN: CPT

## 2019-02-07 PROCEDURE — 87102 FUNGUS ISOLATION CULTURE: CPT

## 2019-02-07 PROCEDURE — 88312 SPECIAL STAINS GROUP 1: CPT

## 2019-02-07 PROCEDURE — 88305 TISSUE EXAM BY PATHOLOGIST: CPT

## 2019-02-07 PROCEDURE — 87015 SPECIMEN INFECT AGNT CONCNTJ: CPT | Mod: 91

## 2019-02-07 PROCEDURE — 87206 SMEAR FLUORESCENT/ACID STAI: CPT

## 2019-02-07 PROCEDURE — 160002 HCHG RECOVERY MINUTES (STAT)

## 2019-02-07 PROCEDURE — 87070 CULTURE OTHR SPECIMN AEROBIC: CPT

## 2019-02-07 PROCEDURE — 99153 MOD SED SAME PHYS/QHP EA: CPT

## 2019-02-07 RX ORDER — SODIUM CHLORIDE 9 MG/ML
500 INJECTION, SOLUTION INTRAVENOUS
Status: DISCONTINUED | OUTPATIENT
Start: 2019-02-07 | End: 2019-02-07 | Stop reason: HOSPADM

## 2019-02-07 RX ORDER — HYDROMORPHONE HYDROCHLORIDE 2 MG/ML
0.25 INJECTION, SOLUTION INTRAMUSCULAR; INTRAVENOUS; SUBCUTANEOUS
Status: DISCONTINUED | OUTPATIENT
Start: 2019-02-07 | End: 2019-02-07 | Stop reason: HOSPADM

## 2019-02-07 RX ORDER — OXYCODONE HYDROCHLORIDE 5 MG/1
2.5 TABLET ORAL
Status: DISCONTINUED | OUTPATIENT
Start: 2019-02-07 | End: 2019-02-07 | Stop reason: HOSPADM

## 2019-02-07 RX ORDER — MIDAZOLAM HYDROCHLORIDE 1 MG/ML
INJECTION INTRAMUSCULAR; INTRAVENOUS
Status: COMPLETED
Start: 2019-02-07 | End: 2019-02-07

## 2019-02-07 RX ORDER — ONDANSETRON 2 MG/ML
4 INJECTION INTRAMUSCULAR; INTRAVENOUS EVERY 8 HOURS PRN
Status: DISCONTINUED | OUTPATIENT
Start: 2019-02-07 | End: 2019-02-07 | Stop reason: HOSPADM

## 2019-02-07 RX ORDER — MIDAZOLAM HYDROCHLORIDE 1 MG/ML
.5-2 INJECTION INTRAMUSCULAR; INTRAVENOUS PRN
Status: DISCONTINUED | OUTPATIENT
Start: 2019-02-07 | End: 2019-02-07 | Stop reason: HOSPADM

## 2019-02-07 RX ORDER — ONDANSETRON 2 MG/ML
4 INJECTION INTRAMUSCULAR; INTRAVENOUS PRN
Status: DISCONTINUED | OUTPATIENT
Start: 2019-02-07 | End: 2019-02-07 | Stop reason: HOSPADM

## 2019-02-07 RX ORDER — LIDOCAINE HYDROCHLORIDE 10 MG/ML
INJECTION, SOLUTION INFILTRATION; PERINEURAL
Status: COMPLETED
Start: 2019-02-07 | End: 2019-02-07

## 2019-02-07 RX ADMIN — MIDAZOLAM 2 MG: 1 INJECTION INTRAMUSCULAR; INTRAVENOUS at 08:36

## 2019-02-07 RX ADMIN — MIDAZOLAM 1 MG: 1 INJECTION INTRAMUSCULAR; INTRAVENOUS at 08:39

## 2019-02-07 RX ADMIN — FENTANYL CITRATE 25 MCG: 50 INJECTION, SOLUTION INTRAMUSCULAR; INTRAVENOUS at 08:29

## 2019-02-07 RX ADMIN — MIDAZOLAM 1 MG: 1 INJECTION INTRAMUSCULAR; INTRAVENOUS at 08:29

## 2019-02-07 RX ADMIN — LIDOCAINE HYDROCHLORIDE: 10 INJECTION, SOLUTION INFILTRATION; PERINEURAL at 08:36

## 2019-02-07 NOTE — PROGRESS NOTES
IR Procedure RN's Note:    Patient consented by Dr. Landeros in [facility] prior to start of procedure; pt and MD signed consent and H&P updated by MD; verified by Pedro SANCHEZ RN.    LEFT upper lung nodule biopsy done by Dr. Landeros; LEFT lateral chest wall access site; pt assessed upon arrival, pt A/Ox4, pt aware of the procedure; x5 cores obtained in formalin and x1 core in saline sent to lab; pt appears comfortable throughout the procedure with no signs of distress or discomfort; ETCO2 monitored with a baseline of 27mmHg and ranged between 25-38mmHg throughout the procedure; access site CDI, soft with no signs of hematoma or bleeding, gauze and tegaderm for dressing; telephone report given to Paul; pt is awake and on 2L NC O2 post procedure and during transport; pt transported to [facility].

## 2019-02-07 NOTE — OR NURSING
0919: Patient from radio;logy to [facility] via [employer] s/p L lung BX. Patient is awake. VSS. Left elysia/lateral chest lung bx site intact.  No c/o pain or nausea at this time. Will monitor closely. Vital signs per MD order. Patient's respiration spontaneous and non-labored. NPO.   1010: 1st CXR done at bedside.   1100: 1st CXR: results::no pneumothorax. Patient tolerating PO intake well.   1200: 2nd CXR taken.   1230: DC instructions given. Questions answered. L lung biopsy site soft,CDI. No c/o pain or nausea. Patient wide awake. Patient's respiration spontaneous and non-labored. VSS. Patient met criteria for discharge.

## 2019-02-07 NOTE — DISCHARGE INSTRUCTIONS
ACTIVITY: Rest and take it easy for the first 24 hours.  A responsible adult is recommended to remain with you during that time.  It is normal to feel sleepy.  We encourage you to not do anything that requires balance, judgment or coordination.    MILD FLU-LIKE SYMPTOMS ARE NORMAL. YOU MAY EXPERIENCE GENERALIZED MUSCLE ACHES, THROAT IRRITATION, HEADACHE AND/OR SOME NAUSEA.    FOR 24 HOURS DO NOT:  Drive, operate machinery or run household appliances.  Drink beer or alcoholic beverages.   Make important decisions or sign legal documents.      DIET: To avoid nausea, slowly advance diet as tolerated, avoiding spicy or greasy foods for the first day.  Add more substantial food to your diet according to your physician's instructions.  Babies can be fed formula or breast milk as soon as they are hungry.  INCREASE FLUIDS AND FIBER TO AVOID CONSTIPATION.    SURGICAL DRESSING/BATHING:    Keep the dressing clean and dry for 24 hours.  May remove dressing tomorrow  and can shower.  Do not submerge site under water for 1 week.  No heavy lifting and limit movement for 2 days.      FOLLOW-UP APPOINTMENT:  A follow-up appointment should be arranged with your doctor ; call to schedule.    You should CALL YOUR PHYSICIAN if you develop:  Fever greater than 101 degrees F.  Pain not relieved by medication, or persistent nausea or vomiting.  Excessive bleeding (blood soaking through dressing) or unexpected drainage from the wound.  Extreme redness or swelling around the incision site, drainage of pus or foul smelling drainage.  Inability to urinate or empty your bladder within 8 hours.  Problems with breathing or chest pain.    You should call 911 if you develop problems with breathing or chest pain.  If you are unable to contact your doctor or surgical center, you should go to the nearest emergency room or urgent care center.      Physician's telephone #: [phone]/    If any questions arise, call your doctor.  If your doctor is not  available, please feel free to call the Surgical Center at [phone].  The Center is open Monday through Friday from 7AM to 7PM.  You can also call the HEALTH HOTLINE open 24 hours/day, 7 days/week and speak to a nurse at [phone], or toll free at [phone].    A registered nurse may call you a few days after your surgery to see how you are doing after your procedure.    MEDICATIONS: Resume taking daily medication.  Take prescribed pain medication with food.  If no medication is prescribed, you may take non-aspirin pain medication if needed.  PAIN MEDICATION CAN BE VERY CONSTIPATING.  Take a stool softener or laxative such as senokot, pericolace, or milk of magnesia if needed.      If your physician has prescribed pain medication that includes Acetaminophen (Tylenol), do not take additional Acetaminophen (Tylenol) while taking the prescribed medication.    Depression / Suicide Risk    As you are discharged from this [facility] facility, it is important to learn how to keep safe from harming yourself.    Recognize the warning signs:  · Abrupt changes in personality, positive or negative- including increase in energy   · Giving away possessions  · Change in eating patterns- significant weight changes-  positive or negative  · Change in sleeping patterns- unable to sleep or sleeping all the time   · Unwillingness or inability to communicate  · Depression  · Unusual sadness, discouragement and loneliness  · Talk of wanting to die  · Neglect of personal appearance   · Rebelliousness- reckless behavior  · Withdrawal from people/activities they love  · Confusion- inability to concentrate     If you or a loved one observes any of these behaviors or has concerns about self-harm, here's what you can do:  · Talk about it- your feelings and reasons for harming yourself  · Remove any means that you might use to hurt yourself (examples: pills, rope, extension cords, firearm)  · Get professional help from the  community (Mental Health, Substance Abuse, psychological counseling)  · Do not be alone:Call your Safe Contact- someone whom you trust who will be there for you.  · Call your local CRISIS HOTLINE 425-1356 or 921-224-7572  · Call your local Children's Mobile Crisis Response Team [place] (526) 741-6690 or www.Fanergies  · Call the toll free National Suicide Prevention Hotlines   · National Suicide Prevention Lifeline 417-584-TVFK (6651)  · [facility] Line Network 800-SUICIDE (109-0704)                      Lung Biopsy    A lung biopsy is a procedure in which a tissue sample is removed from the lung. The tissue can be examined under a microscope to help diagnose various lung disorders.   LET YOUR HEALTH CARE PROVIDER KNOW ABOUT:  · Any allergies you have.  · All medicines you are taking, including vitamins, herbs, eye drops, creams, and over-the-counter medicines.  · Previous problems you or members of your family have had with the use of anesthetics.  · Any blood disorders or bleeding problems that you have.  · Previous surgeries you have had.  · Medical conditions you have.  RISKS AND COMPLICATIONS  Generally, a lung biopsy is a safe procedure. However, problems can occur and include:  · Collapse of the lung.    · Bleeding.    · Infection.    BEFORE THE PROCEDURE  · Do not eat or drink anything after midnight on the night before the procedure or as directed by your health care provider.  · Ask your health care provider about changing or stopping your regular medicines. This is especially important if you are taking diabetes medicines or blood thinners.  · Plan to have someone take you home after the procedure.  PROCEDURE  Various methods can be used to perform a lung biopsy:   · Needle biopsy. A biopsy needle is inserted into the lung. The needle is used to collect the tissue sample. A CT scanner may be used to guide the needle to the right place in the lung. For this method, a medicine is used to  numb the area where the biopsy sample will be taken (local anesthetic).  · Bronchoscopy. A flexible tube (bronchoscope) is inserted into your lungs by going through your mouth or nose. A needle or forceps is passed through the bronchoscope to remove the tissue sample. For this method, medicine may be used to numb the back of your throat.  · Open biopsy. A cut (incision) is made in your chest. The tissue sample is then removed using surgical tools. The incision is closed with skin glue, skin adhesive strips, or stitches. For this method, you will be given medicine to make you sleep through the procedure (general anesthetic).  AFTER THE PROCEDURE  · Your recovery will be assessed and monitored.  · You might have soreness and tenderness at the site of the biopsy for a few days after the procedure.  · You might have a cough and some soreness in your throat for a few days if a bronchoscope was used.  This information is not intended to replace advice given to you by your health care provider. Make sure you discuss any questions you have with your health care provider.  Document Released: 03/08/2006 Document Revised: 01/08/2016 Document Reviewed: 06/01/2014  Elsevier Interactive Patient Education © 2017 [employer]

## 2019-02-07 NOTE — OR SURGEON
Immediate Post- Operative Note    PostOp Diagnosis: SONG LUNG NODULE      Procedure(s): CT GUIDED SONG LUNG BIOPSY    20G CORES X 6. FORMALIN X 5. SALINE X 1 FOR C+S, GRAM, AFB, FUNGAL.       Estimated Blood Loss: <5CC        Complications: MILD PARENCHYMAL HEMORRHAGE AT POSTERIOR MARGIN OF TARGETED NODULE. NO HEMOPTYSIS.           2/7/2019     9:07 AM     Keith Landeros

## 2019-02-12 LAB
BACTERIA TISS AEROBE CULT: ABNORMAL
BACTERIA TISS AEROBE CULT: ABNORMAL
GRAM STN SPEC: ABNORMAL
SIGNIFICANT IND 70042: ABNORMAL
SITE SITE: ABNORMAL
SOURCE SOURCE: ABNORMAL

## 2019-02-25 DIAGNOSIS — I10 ESSENTIAL HYPERTENSION: ICD-10-CM

## 2019-02-25 RX ORDER — LISINOPRIL 20 MG/1
20 TABLET ORAL DAILY
Qty: 90 TAB | Refills: 0 | Status: SHIPPED | OUTPATIENT
Start: 2019-02-25

## 2019-02-26 ENCOUNTER — OFFICE VISIT (OUTPATIENT)
Dept: PULMONOLOGY | Facility: HOSPICE | Age: 59
End: 2019-02-26
Payer: COMMERCIAL

## 2019-02-26 VITALS
WEIGHT: 206 LBS | OXYGEN SATURATION: 92 % | HEIGHT: 71 IN | DIASTOLIC BLOOD PRESSURE: 74 MMHG | HEART RATE: 99 BPM | BODY MASS INDEX: 28.84 KG/M2 | TEMPERATURE: 97.5 F | SYSTOLIC BLOOD PRESSURE: 126 MMHG | RESPIRATION RATE: 16 BRPM

## 2019-02-26 DIAGNOSIS — J85.1 ABSCESS OF LEFT LUNG WITH PNEUMONIA, UNSPECIFIED PART OF LUNG (HCC): ICD-10-CM

## 2019-02-26 PROCEDURE — 99214 OFFICE O/P EST MOD 30 MIN: CPT | Performed by: INTERNAL MEDICINE

## 2019-02-26 NOTE — PROGRESS NOTES
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  He was hospitalized November 20, 2018 for cellulitis of the right jaw.  He had noted right lower jaw edema and low-grade fevers in the days preceding hospitalization.  He was treated with Augmentin for 10 days with resolution of symptoms.  His neck CAT scan had incidental finding of pulmonary nodules.  Subsequent chest CAT scan with contrast November 29, 2018 was reviewed and showed bilateral, scattered pulmonary nodules, the largest measuring 1.5 cm in the left upper lobe with cavitation. No infiltrates or gross lymphadenopathy was appreciated.  The patient has <5 PK YRs of smoking remotely.  His QuantiFERON TB Gold test is negative. He lived in [place] in the past and was treated with 9 months of INH for +PPD test, although subsequent QuantiFERON TB Gold tests have been negative. Denies IVDU. Follow-up chest CAT scan on January 28, 2019 was personally reviewed with the patient and showed interval resolution of the majority of nodules, however persistent subpleural left upper lobe nodule measuring 1.5 x 1.2 cm, now with no cavitation.  He subsequently underwent CT-guided biopsy on February 7, 2019 with benign lung tissue noted, and no evidence of malignancy.  Cultures grew fusobacterium.  Following biopsy he had decompensation and was hospitalized at [facility] -medical records have been requested.  He was treated empirically with Levaquin.  Continues to feel poorly, with fatigue.    Past Medical History:   Diagnosis Date   • Fever    • GERD (gastroesophageal reflux disease)    • Heart burn    • Hypertension    • Indigestion    • Kidney stone    • Renal disorder     stones       Social History     Social History   • Marital status: Single     Spouse name: N/A   • Number of children: N/A   • Years of education: N/A     Occupational  History   • Not on file.     Social History Main Topics   • Smoking status: Former Smoker     Packs/day: 0.50     Years: 4.00     Types: Cigarettes     Quit date: 10/20/1985   • Smokeless tobacco: Never Used   • Alcohol use Yes      Comment: 3 a day   • Drug use: No   • Sexual activity: Not on file     Other Topics Concern   • Not on file     Social History Narrative   • No narrative on file       Family History   Problem Relation Age of Onset   • No Known Problems Mother    • Heart Disease Father         Passed at 49 from [place]   • Heart Disease Step-Brother    • Lung Disease Maternal Grandmother        Current Outpatient Prescriptions on File Prior to Visit   Medication Sig Dispense Refill   • lisinopril (PRINIVIL) 20 MG Tab Take 1 Tab by mouth every day. 90 Tab 0   • simvastatin (ZOCOR) 40 MG Tab Take 1 Tab by mouth every evening. 90 Tab 3   • aspirin EC (ECOTRIN) 81 MG Tablet Delayed Response Take 81 mg by mouth every bedtime.     • ranitidine (ZANTAC) 75 MG tablet Take 35 mg by mouth every evening.       No current facility-administered medications on file prior to visit.        Allergies: Codeine    ROS:   Constitutional: Denies fevers, chills, night sweats, +fatigue, denies weight loss  Eyes: Denies vision loss, pain, drainage, double vision  Ears, Nose, Throat: Denies earache, difficulty hearing, tinnitus, nasal congestion, hoarseness  Cardiovascular: Denies chest pain, tightness, palpitations, orthopnea or edema  Respiratory: Denies shortness of breath, cough, wheezing, hemoptysis  Sleep: Denies daytime sleepiness, snoring, apneas, insomnia, morning headaches  GI: Denies heartburn, dysphagia, nausea, abdominal pain, diarrhea or constipation  : Denies frequent urination, hematuria, discharge or painful urination  Musculoskeletal: Denies back pain, painful joints, sore muscles  Neurological: Denies weakness or headaches  Skin: No rashes    Blood pressure 126/74, pulse 99, temperature 36.4 °C (97.5 °F),  "temperature source Temporal, resp. rate 16, height 1.803 m (5' 11\"), weight 93.4 kg (206 lb), SpO2 92 %.    Physical Exam:  Appearance: Well-nourished, well-developed, in no acute distress  HEENT: Normocephalic, atraumatic, white sclera, PERRLA, oropharynx clear  Neck: No adenopathy or masses  Respiratory: no intercostal retractions or accessory muscle use  Lungs auscultation: Clear to auscultation bilaterally  Cardiovascular: Regular rate rhythm. No murmurs, rubs or gallops.  No LE edema  Abdomen: soft, nondistended  Gait: Normal  Digits: No clubbing, cyanosis  Motor: No focal deficits  Orientation: Oriented to time, person and place    Diagnosis:  1. Abscess of left lung with pneumonia, unspecified part of lung (HCC)  DX-CHEST-2 VIEWS       Plan:  The patient lung biopsy is consistent with an abscess (septic emboli) secondary to his ENT infection ? Jugular vein thrombophlebitis.  Cultures confirmed fusobacterium infection.  I have contacted infectious disease directly for consultation and antibiotic management.  Return in about 8 weeks (around 4/23/2019) for with CXR.      "

## 2019-02-27 ENCOUNTER — TELEPHONE (OUTPATIENT)
Dept: PULMONOLOGY | Facility: HOSPICE | Age: 59
End: 2019-02-27

## 2019-02-27 DIAGNOSIS — I76 SEPTIC EMBOLISM (HCC): ICD-10-CM

## 2019-03-01 ENCOUNTER — TELEPHONE (OUTPATIENT)
Dept: PULMONOLOGY | Facility: HOSPICE | Age: 59
End: 2019-03-01

## 2019-03-01 NOTE — TELEPHONE ENCOUNTER
Patient came into the office to drop off [facility]  Paperwork. He would like it either e-mailed or faxed to the employer when completed. The return information is attached to the paperwork. Patient sees ANTHONY. Paperwork is in the MA mailbox at the .

## 2019-03-01 NOTE — TELEPHONE ENCOUNTER
Reina Lovell forward this to one of the NP or A doc. I am out of town until Monday March 11th.   Thanks!

## 2019-03-04 ENCOUNTER — TELEPHONE (OUTPATIENT)
Dept: PULMONOLOGY | Facility: HOSPICE | Age: 59
End: 2019-03-04

## 2019-03-04 NOTE — TELEPHONE ENCOUNTER
I will send information to [facility] for home infusion.     Called and advised patient that I was sending it to [facility] as well, he is on board and has my direct numbr if any problems arise.    Also received a message from PICC scheduling wether it was a new PICC and if it was single or double. It is a new picc placement with single lumen. Asked Anna Marie Ramirez where this needed to be clarified

## 2019-03-06 ENCOUNTER — HOSPITAL ENCOUNTER (OUTPATIENT)
Dept: LAB | Facility: MEDICAL CENTER | Age: 59
End: 2019-03-06
Attending: INTERNAL MEDICINE
Payer: COMMERCIAL

## 2019-03-06 ENCOUNTER — OFFICE VISIT (OUTPATIENT)
Dept: INFECTIOUS DISEASES | Facility: MEDICAL CENTER | Age: 59
End: 2019-03-06
Payer: COMMERCIAL

## 2019-03-06 VITALS
DIASTOLIC BLOOD PRESSURE: 64 MMHG | TEMPERATURE: 98 F | WEIGHT: 203 LBS | OXYGEN SATURATION: 96 % | HEIGHT: 71 IN | BODY MASS INDEX: 28.42 KG/M2 | HEART RATE: 85 BPM | SYSTOLIC BLOOD PRESSURE: 120 MMHG

## 2019-03-06 DIAGNOSIS — R91.8 MULTIPLE LUNG NODULES ON CT: ICD-10-CM

## 2019-03-06 DIAGNOSIS — J85.2 ABSCESS OF UPPER LOBE OF LEFT LUNG WITHOUT PNEUMONIA (HCC): ICD-10-CM

## 2019-03-06 LAB
CRP SERPL HS-MCNC: 1.08 MG/DL (ref 0–0.75)
ERYTHROCYTE [SEDIMENTATION RATE] IN BLOOD BY WESTERGREN METHOD: 71 MM/HOUR (ref 0–20)
FUNGUS SPEC CULT: NORMAL
SIGNIFICANT IND 70042: NORMAL
SITE SITE: NORMAL
SOURCE SOURCE: NORMAL

## 2019-03-06 PROCEDURE — 99214 OFFICE O/P EST MOD 30 MIN: CPT | Performed by: INTERNAL MEDICINE

## 2019-03-06 PROCEDURE — 86140 C-REACTIVE PROTEIN: CPT

## 2019-03-06 PROCEDURE — 36415 COLL VENOUS BLD VENIPUNCTURE: CPT

## 2019-03-06 PROCEDURE — 85652 RBC SED RATE AUTOMATED: CPT

## 2019-03-06 RX ORDER — AMOXICILLIN AND CLAVULANATE POTASSIUM 875; 125 MG/1; MG/1
1 TABLET, FILM COATED ORAL 3 TIMES DAILY
Qty: 90 TAB | Refills: 1 | Status: SHIPPED | OUTPATIENT
Start: 2019-03-06 | End: 2019-04-06

## 2019-03-06 ASSESSMENT — ENCOUNTER SYMPTOMS
SPEECH CHANGE: 0
DIARRHEA: 0
WEIGHT LOSS: 0
COUGH: 0
EYE PAIN: 0
PHOTOPHOBIA: 0
SORE THROAT: 0
PALPITATIONS: 0
FEVER: 0
SINUS PAIN: 0
NAUSEA: 0
CHILLS: 0
SPUTUM PRODUCTION: 0
WHEEZING: 0
PSYCHIATRIC NEGATIVE: 1
MUSCULOSKELETAL NEGATIVE: 1
ORTHOPNEA: 0
ABDOMINAL PAIN: 0
SENSORY CHANGE: 0
VOMITING: 0
FOCAL WEAKNESS: 0
EYE DISCHARGE: 0

## 2019-03-06 NOTE — PROGRESS NOTES
ADULT INFECTIOUS DISEASE CONSULT     Date of Service: 03/06/19    Consult Requested By: Dr Jha, pulmonologist    Reason for Consultation: Left upper lobe lung abscess    History of Present Illness:   Benito Davis is a 59 y.o M came in to be evaluated for left upper lobe lung abscess.   He was found to have lung nodules incidentally seen on jaw CT back in Nov, 2018 [facility] [place] [facility].   Subsequent CT chest in Nov, 2018 showed bilateral scattered pulmonary nodules and SONG 1.5 cm lesion with cavity formation.   Repeat CT in Jan, 2019 showed improvement of multiple nodules and cavity in SONG resolved, but 1.5 cm lesion was persistent. At that point, CT guided biopsied was performed on 2/7/19, Bx consistent with acute and chronic inflammation with benign lung tissue. Gram stain and Cx grew Fusobacterium. AFB and fungal Cx were neg.   He felt SOB and was low grade fever 100.6F, having productive cough with greenish sputum after Bx, so he went to ER at [facility] and admitted for PNA. He was given a week of levoquin for that with symptomatic improvement. He finished it on 2/14/19.  Since then, he denies fever, chills, weight loss. He has some appetite loss and still has left pleurisy, but overall feeling better.   He denies having any symptoms prior for jaw cellulitis. Denies any valvular heart disease. He does have dental problems and needs a few teeth pulled.     Review Of Systems:  Review of Systems   Constitutional: Positive for malaise/fatigue. Negative for chills, fever and weight loss.   HENT: Negative for congestion, sinus pain and sore throat.    Eyes: Negative for photophobia, pain and discharge.   Respiratory: Negative for cough, sputum production and wheezing.    Cardiovascular: Positive for chest pain. Negative for palpitations, orthopnea and leg swelling.   Gastrointestinal: Negative for abdominal pain, diarrhea, nausea and vomiting.   Genitourinary: Negative.    Musculoskeletal:  Negative.    Skin: Negative for itching and rash.   Neurological: Negative for sensory change, speech change and focal weakness.   Psychiatric/Behavioral: Negative.          PMH:   Past Medical History:   Diagnosis Date   • Fever    • GERD (gastroesophageal reflux disease)    • Heart burn    • Hypertension    • Indigestion    • Kidney stone    • Renal disorder     stones         PSH:  Past Surgical History:   Procedure Laterality Date   • CYSTOSCOPY  10/2/2015    Procedure: CYSTOSCOPY ;  Surgeon: Zaki Fitzgerald M.D.;  Location: SURGERY [facility];  Service:    • URETEROSCOPY Right 10/2/2015    Procedure: URETEROSCOPY;  Surgeon: Zaki Fitzgerald M.D.;  Location: SURGERY [facility];  Service:    • LASERTRIPSY  10/2/2015    Procedure:  LASER LITHOTRIPSY;  Surgeon: Zaki Fitzgerald M.D.;  Location: SURGERY [facility];  Service:    • STENT PLACEMENT Left 10/2/2015    Procedure: STENT PLACEMENT;  Surgeon: Zaki Fitzgerald M.D.;  Location: SURGERY [facility];  Service:    • OTHER  2012    ganglion cyst removal Left wrist   • OTHER ABDOMINAL SURGERY  1990    appendectomy       FAMILY HX:  Family History   Problem Relation Age of Onset   • No Known Problems Mother    • Heart Disease Father         Passed at 49 from [place]   • Heart Disease Step-Brother    • Lung Disease Maternal Grandmother        SOCIAL HX:  Social History     Social History   • Marital status: Single     Spouse name: N/A   • Number of children: N/A   • Years of education: N/A     Occupational History   • Not on file.     Social History Main Topics   • Smoking status: Former Smoker     Packs/day: 0.50     Years: 4.00     Types: Cigarettes     Quit date: 10/20/1985   • Smokeless tobacco: Never Used   • Alcohol use Yes      Comment: 3 a day   • Drug use: No   • Sexual activity: Not on file     Other Topics Concern   • Not on file     Social History Narrative   • No narrative on file     History   Smoking Status   • Former Smoker   • Packs/day:  "0.50   • Years: 4.00   • Types: Cigarettes   • Quit date: 10/20/1985   Smokeless Tobacco   • Never Used     History   Alcohol Use   • Yes     Comment: 3 a day       Allergies/Intolerances:  Allergies   Allergen Reactions   • Codeine Rash       History reviewed with the patient    Other Current Medications:    Current Outpatient Prescriptions:   •  lisinopril (PRINIVIL) 20 MG Tab, Take 1 Tab by mouth every day., Disp: 90 Tab, Rfl: 0  •  simvastatin (ZOCOR) 40 MG Tab, Take 1 Tab by mouth every evening., Disp: 90 Tab, Rfl: 3  •  ranitidine (ZANTAC) 75 MG tablet, Take 35 mg by mouth every evening., Disp: , Rfl:   •  aspirin EC (ECOTRIN) 81 MG Tablet Delayed Response, Take 81 mg by mouth every bedtime., Disp: , Rfl:       Most Recent Vital Signs:  /64   Pulse 85   Temp 36.7 °C (98 °F) (Temporal)   Ht 1.803 m (5' 11\")   Wt 92.1 kg (203 lb)   SpO2 96%   BMI 28.31 kg/m²     Physical Exam:  General: Nontoxic, no acute distress  HEENT: sclera anicteric, PERRL, EOMI, MMM, no oral lesions or thrush. Poor dentation.  Neck: supple, no lymphadenopathy  Chest: CTAB, no r/r/w, normal work of breathing.  Cardiac: Regular, no murmurs no gallops heard  Abdomen: + bowel sounds, soft, non-tender, non-distended, no HSM  Extremities: No edema. No joint swelling.  Skin: no rashes or erythema  Neuro: Alert and oriented times 3, non-focal exam    Pertinent Lab Results:  No previous ESR, CRP, no recent CBC    Pertinent Micro:   From 2/7/19  Component   Significant Indicator  (Positive)   POS (POS)    Source   TISS    Site   Left Upper Lobe Biopsy    Tissue Culture  (Abnormal)      Growth noted after further incubation, see below for   organism identification.     Gram Stain Result  (Abnormal)   Rare Gram positive rods.     Tissue Culture  (Abnormal)      Fusobacterium species   Isolated from enrichment broth only, please correlate with   clinical condition.     Narrative     CALL  [facility]  [facility] tel. [phone],       Fungal Culture " (Order [id number]) - Reflex for Order [id number]   Component Results     Component   Significant Indicator   NEG    Source   TISS    Site   Left Upper Lobe Biopsy    Fungal Culture   No fungal growth    Narrative     CALL  [facility] tel. [phone],       ACID FAST STAIN (Order [id number]) - Reflex for Order [id number]   Component Results     Component   Significant Indicator   NEG    Source   TISS    Site   Left Upper Lobe Biopsy    AFB Smear Results   No acid fast bacilli seen.        Studies:                                  Results for orders placed during the hospital encounter of 01/28/19   CT-CHEST (THORAX) W/O    Impression Subpleural left upper lobe nodule which previously demonstrated cavitation measures 1.5 x 1.2 cm with no cavitation seen on the current examination. There is mild peripheral groundglass opacity. Malignancy is not excluded. Further evaluation can be   performed with PET/CT.    Other bilateral pulmonary nodules have resolved or significantly decreased in size and were likely infectious/inflammatory.    Atherosclerotic plaque including coronary artery calcification.              Results for orders placed during the hospital encounter of 11/28/18   CT-CHEST (THORAX) WITH    Impression Bilateral pulmonary nodules and ill-defined nodular opacities. Some of these have surrounding groundglass opacity. A pleural-based 1.5 mm nodule in the left upper lobe exhibits mild cavitation. Findings may be infectious, related to noninfective   granulomatous disease, with malignancy not excluded.    Borderline prominent mediastinal lymph nodes are nonspecific.    Atherosclerotic plaque including coronary artery calcification.    Bilateral nonobstructing renal calculi.    Air-fluid levels in the colon can be seen in the setting of diarrhea.                                                         IMPRESSION:     1. Abscess of upper lobe of left lung without pneumonia (HCC)  2. Multiple lung nodules on  CT          PLAN:   Benito Davis is a 59 y.o. M with a history of right jaw cellulitis, found to have multiple lung nodules and SONG small cavitary abscess following jaw cellulitis. The multiple scattered nodules and cavity were resolved. However, remaining SONG nodule biopsy grew Fusobacterium, most likely originated from jaw cellulitis, ? Jugular vein plebitis.   [facility] CT with contrast on 11/27/18 did not show 'any significant vascular abnormality'. He got an echo in [facility] on 2/819 showed moderate TR with pulmonary HTN and mild MR, no vegetations were mentioned. He is feeling better already after a week of levofloxacin. We will prescribe 2 weeks course of Augmentin which will cover Fusobacterium. We will get ESR and CRP today and see him back in clinic in 2 weeks.     Of note, repeat CXR in [facility] on 2/15/19 still showing LLL infiltrates which could last for weeks after CAP treatment. He is afebrile, lungs are clear today. We will reevaluate clinically again after 2 weeks of Augmentin.      Francheska Daniel M.D.

## 2019-03-07 NOTE — TELEPHONE ENCOUNTER
I do not see paperwork competed in our office. [facility] paper work must be located in Pulmonary  inbox. Will review when back to the pulmonary clinic 3/8/19     Last seen 2/26/19 Dr. Jha   Plan:  The patient lung biopsy is consistent with an abscess (septic emboli) secondary to his ENT infection ? Jugular vein thrombophlebitis.  Cultures confirmed fusobacterium infection.  I have contacted infectious disease directly for consultation and antibiotic management.  Return in about 8 weeks (around 4/23/2019) for with CXR.    Next OV 4/30/19 Dr. Jha

## 2019-03-07 NOTE — TELEPHONE ENCOUNTER
Spoke to Merlin concerning calling [facility] back to get his medication. He advised me today that when he saw his ID doctor today that they had canceled and started him on oral antibiotics. I let [facility] know.   I know Dr. Jha is out of town till next Monday so if we need just leave this message in her box until she gets back

## 2019-03-08 NOTE — TELEPHONE ENCOUNTER
Reviewed with Dr. Hernández as well.    Best to leave for Dr. Jha to complete given no previous [facility] paperwork to review and patient has seen Dr. Jha exclusively.

## 2019-03-12 NOTE — TELEPHONE ENCOUNTER
Dr. Jha was consulted with ID for not doing PICC and she is okay with that.   She needs to know if we still need FMLA paperwork filled out, LVM for patient regarding

## 2019-03-19 NOTE — TELEPHONE ENCOUNTER
Patsy Jha M.D.   You 27 minutes ago (7:58 AM)      He no longer requires FMLA (Routing comment)

## 2019-03-20 ENCOUNTER — OFFICE VISIT (OUTPATIENT)
Dept: INFECTIOUS DISEASES | Facility: MEDICAL CENTER | Age: 59
End: 2019-03-20
Payer: COMMERCIAL

## 2019-03-20 VITALS
HEIGHT: 71 IN | BODY MASS INDEX: 29.54 KG/M2 | WEIGHT: 211 LBS | DIASTOLIC BLOOD PRESSURE: 60 MMHG | TEMPERATURE: 99 F | HEART RATE: 94 BPM | SYSTOLIC BLOOD PRESSURE: 100 MMHG | OXYGEN SATURATION: 92 %

## 2019-03-20 DIAGNOSIS — R07.81 PLEURODYNIA: ICD-10-CM

## 2019-03-20 DIAGNOSIS — J85.2 ABSCESS OF UPPER LOBE OF LEFT LUNG WITHOUT PNEUMONIA (HCC): ICD-10-CM

## 2019-03-20 DIAGNOSIS — R91.8 MULTIPLE LUNG NODULES ON CT: ICD-10-CM

## 2019-03-20 PROCEDURE — 99212 OFFICE O/P EST SF 10 MIN: CPT | Performed by: INTERNAL MEDICINE

## 2019-03-20 RX ORDER — AMOXICILLIN AND CLAVULANATE POTASSIUM 875; 125 MG/1; MG/1
1 TABLET, FILM COATED ORAL 3 TIMES DAILY
Qty: 42 TAB | Refills: 0 | Status: SHIPPED | OUTPATIENT
Start: 2019-03-20 | End: 2019-04-03

## 2019-03-20 NOTE — PROGRESS NOTES
Infectious Disease Clinic    Subjective:     Chief Complaint   Patient presents with   • Follow-Up     Abscess of upper lobe of left lung without pneumonia      Benito Davis is a 59 y.o M came in to be evaluated for left upper lobe lung abscess. He was previously found to have lung nodules incidentally seen on jaw CT in Nov, 2018 [facility] [place] [facility] [place].   Subsequent CT chest at the time showed bilateral scattered pulmonary nodules and SONG 1.5 cm lesion with cavity formation. Repeat CT in Jan, 2019 showed improvement of multiple nodules and cavity in SONG resolved, but 1.5 cm lesion was persistent. At that point, CT guided biopsied was performed on 2/7/19, Bx consistent with acute and chronic inflammation with benign lung tissue. Gram stain and Cx grew Fusobacterium. AFB and fungal Cx were neg. He then reported a fever 100.6F,  productive cough after the lung biopsy .  Due to this he went to ER at [facility] and admitted for PNA. He was given a week of levoquin, which he completed on 2/14/19.      He was then seen in ID clinic on 3/6/19 due to the finding of Fusobacterium he was prescribed 2 weeks of Augmentin with plan to return to clinic after completion of his antibiotic course.     Today, 3/20/2019: Patient reports that he has been taking the Augmentin with some improvement.  However he continues to have shortness of breath, reduced exercise tolerance and pleuritic chest pain.     ROS  Review of Systems   Constitutional: Negative for chills and fever.   Respiratory: Positive for cough, sputum production and shortness of breath.    Cardiovascular: Positive for chest pain.   Gastrointestinal: Negative for abdominal pain, diarrhea, nausea and vomiting.   Neurological: Negative for weakness.         Past Medical History:   Diagnosis Date   • Fever    • GERD (gastroesophageal reflux disease)    • Heart burn    • Hypertension    • Indigestion    • Kidney stone    • Renal disorder     stones  "      Social History   Substance Use Topics   • Smoking status: Former Smoker     Packs/day: 0.50     Years: 4.00     Types: Cigarettes     Quit date: 10/20/1985   • Smokeless tobacco: Never Used   • Alcohol use Yes      Comment: 3 a day       Allergies: Codeine    Pt's medication and problem list reviewed.     Objective:     PE:  /60   Pulse 94   Temp 37.2 °C (99 °F) (Temporal)   Ht 1.803 m (5' 11\")   Wt 95.7 kg (211 lb)   SpO2 92%   BMI 29.43 kg/m²     Vital signs reviewed    Constitutional: Appears well-developed and well-nourished. No acute distress.  Speech fluent.    Eyes: Conjunctivae normal and EOM are normal. Pupils are equal, round, and reactive to light.   ENMT: Lips without lesions, good dentition.  Oropharynx is clear and moist.  Neck: Trachea midline. Normal range of motion. Neck supple. No masses.  No JVD.  Cardiovascular: Normal rate, regular rhythm, normal heart sounds and intact distal pulses. No murmur, gallop, or friction rub. No edema.  Respiratory: No respiratory distress, unlabored respiratory effort.  Lungs clear to auscultation bilaterally. No wheezes or rales.   Abdomen: Soft, non tender, non-distended. BS + x 4. No masses or hepatosplenomegaly.   Musculoskeletal: Steady gait.  Normal range of motion.  No joint or bone tenderness, swelling, erythema or deformity.  No clubbing or cyanosis.  Skin: Warm and dry. Good turgor. No visible rashes or lesions.  Neurological: No cranial nerve deficit. Coordination normal.  Sensation intact.  Psychiatric: Alert and oriented to person, place, and time. Normal mood, calm affect.  Normal behavior and judgment.     Labs:  WBC   Date/Time Value Ref Range Status   11/29/2018 03:23 AM 9.4 4.8 - 10.8 K/uL Final     RBC   Date/Time Value Ref Range Status   11/29/2018 03:23 AM 4.52 (L) 4.70 - 6.10 M/uL Final     Hemoglobin   Date/Time Value Ref Range Status   11/29/2018 03:23 AM 14.4 14.0 - 18.0 g/dL Final     Hematocrit   Date/Time Value Ref Range " Status   11/29/2018 03:23 AM 41.8 (L) 42.0 - 52.0 % Final     MCV   Date/Time Value Ref Range Status   11/29/2018 03:23 AM 92.5 81.4 - 97.8 fL Final     MCH   Date/Time Value Ref Range Status   11/29/2018 03:23 AM 31.9 27.0 - 33.0 pg Final     MCHC   Date/Time Value Ref Range Status   11/29/2018 03:23 AM 34.4 33.7 - 35.3 g/dL Final     MPV   Date/Time Value Ref Range Status   11/29/2018 03:23 AM 9.2 9.0 - 12.9 fL Final        Sodium   Date/Time Value Ref Range Status   11/29/2018 03:23  135 - 145 mmol/L Final     Potassium   Date/Time Value Ref Range Status   11/29/2018 03:23 AM 4.3 3.6 - 5.5 mmol/L Final     Chloride   Date/Time Value Ref Range Status   11/29/2018 03:23  96 - 112 mmol/L Final     Co2   Date/Time Value Ref Range Status   11/29/2018 03:23 AM 26 20 - 33 mmol/L Final     Glucose   Date/Time Value Ref Range Status   11/29/2018 03:23 AM 99 65 - 99 mg/dL Final     Bun   Date/Time Value Ref Range Status   11/29/2018 03:23 AM 14 8 - 22 mg/dL Final     Creatinine   Date/Time Value Ref Range Status   11/29/2018 03:23 AM 1.04 0.50 - 1.40 mg/dL Final     Bun-Creatinine Ratio   Date/Time Value Ref Range Status   01/12/2015 04:03 PM 12 9 - 20 Final       Alkaline Phosphatase   Date/Time Value Ref Range Status   11/29/2018 03:23 AM 96 30 - 99 U/L Final     AST(SGOT)   Date/Time Value Ref Range Status   11/29/2018 03:23 AM 18 12 - 45 U/L Final     ALT(SGPT)   Date/Time Value Ref Range Status   11/29/2018 03:23 AM 25 2 - 50 U/L Final     Total Bilirubin   Date/Time Value Ref Range Status   11/29/2018 03:23 AM 0.5 0.1 - 1.5 mg/dL Final        No results found for: CPKTOTAL     Assessment and Plan:   The following treatment plan was discussed with patient at length:    1. Pleurodynia  CT-CHEST (THORAX) W/O   2. Abscess of upper lobe of left lung without pneumonia (HCC)     3. Multiple lung nodules on CT       --- As the patient has a history of nodules as well as lung abscess and and is still describing  cough, shortness of breath and pleuritic chest pain elected to extend antibiotics for another 2 weeks  --- Repeat CT chest in 2 weeks  --- Return to clinic in 2 weeks after CT chest      Discussed dosing and side effects of medications being prescribed.  Pt instructed to call clinic with any adverse effects or to discontinue the medication and go to the ER should difficulty breathing, SOB, CP, facial swelling and/or gross rash/itching occurs.     Follow up: 2 weeks, RTC sooner if needed. FU with PCP for ongoing chronic medical conditions.     Nitza Bell M.D.       Please note that this dictation was created using voice recognition software. I have  worked with technical experts from [facility] to optimize the interface.  I have made every reasonable attempt to correct obvious errors, but there may be errors of grammar and possibly content that I did not discover before finalizing the note.

## 2019-03-21 ASSESSMENT — ENCOUNTER SYMPTOMS
SPUTUM PRODUCTION: 1
VOMITING: 0
COUGH: 1
WEAKNESS: 0
FEVER: 0
DIARRHEA: 0
NAUSEA: 0
SHORTNESS OF BREATH: 1
CHILLS: 0
ABDOMINAL PAIN: 0

## 2019-04-04 LAB
MYCOBACTERIUM SPEC CULT: NORMAL
RHODAMINE-AURAMINE STN SPEC: NORMAL
SIGNIFICANT IND 70042: NORMAL
SITE SITE: NORMAL
SOURCE SOURCE: NORMAL

## 2019-04-08 ENCOUNTER — HOSPITAL ENCOUNTER (OUTPATIENT)
Dept: RADIOLOGY | Facility: MEDICAL CENTER | Age: 59
End: 2019-04-08
Attending: INTERNAL MEDICINE
Payer: COMMERCIAL

## 2019-04-08 DIAGNOSIS — R07.81 PLEURODYNIA: ICD-10-CM

## 2019-04-08 PROCEDURE — 71250 CT THORAX DX C-: CPT

## 2019-04-10 ENCOUNTER — OFFICE VISIT (OUTPATIENT)
Dept: INFECTIOUS DISEASES | Facility: MEDICAL CENTER | Age: 59
End: 2019-04-10
Payer: COMMERCIAL

## 2019-04-10 VITALS
HEART RATE: 70 BPM | TEMPERATURE: 97.2 F | BODY MASS INDEX: 30.1 KG/M2 | WEIGHT: 215 LBS | DIASTOLIC BLOOD PRESSURE: 78 MMHG | HEIGHT: 71 IN | OXYGEN SATURATION: 94 % | SYSTOLIC BLOOD PRESSURE: 118 MMHG

## 2019-04-10 DIAGNOSIS — R91.8 MULTIPLE LUNG NODULES ON CT: ICD-10-CM

## 2019-04-10 DIAGNOSIS — R07.81 PLEURODYNIA: ICD-10-CM

## 2019-04-10 DIAGNOSIS — J85.2 ABSCESS OF UPPER LOBE OF LEFT LUNG WITHOUT PNEUMONIA (HCC): ICD-10-CM

## 2019-04-10 PROCEDURE — 99212 OFFICE O/P EST SF 10 MIN: CPT | Performed by: INTERNAL MEDICINE

## 2019-04-10 RX ORDER — LEVOFLOXACIN 750 MG/1
TABLET, FILM COATED ORAL
COMMUNITY
Start: 2019-02-08 | End: 2023-12-05

## 2019-04-10 RX ORDER — CLONAZEPAM 0.5 MG/1
TABLET ORAL
COMMUNITY
Start: 2019-03-06 | End: 2023-12-05

## 2019-04-10 ASSESSMENT — ENCOUNTER SYMPTOMS
ABDOMINAL PAIN: 0
SPUTUM PRODUCTION: 0
CHILLS: 0
DIARRHEA: 0
HEMOPTYSIS: 0
COUGH: 0
NAUSEA: 0
WEAKNESS: 0
FEVER: 0
SHORTNESS OF BREATH: 0
VOMITING: 0

## 2019-04-10 NOTE — PROGRESS NOTES
Infectious Disease Clinic    Subjective:     Chief Complaint   Patient presents with   • Follow-Up     Pleurodynia     Benito Davis is a 59 y.o M came in to be evaluated for left upper lobe lung abscess. He was previously found to have lung nodules incidentally seen on jaw CT in Nov, 2018 when he was admitted in [facility] with [place] cellulitis. Subsequent CT chest at the time showed bilateral scattered pulmonary nodules and SONG 1.5 cm lesion with cavity formation. Repeat CT in Jan, 2019 showed improvement of multiple nodules and cavity in SONG resolved, but 1.5 cm lesion was persistent. At that point, CT guided biopsied was performed on 2/7/19, Bx consistent with acute and chronic inflammation with benign lung tissue. Gram stain and Cx grew Fusobacterium. AFB and fungal Cx were neg. He then reported a fever 100.6F,  productive cough after the lung biopsy .  Due to this he went to ER at [facility] and admitted for PNA. He was given a week of levoquin, which he completed on 2/14/19.       He was then seen in ID clinic on 3/6/19 due to the finding of Fusobacterium he was prescribed 2 weeks of Augmentin with plan to return to clinic after completion of his antibiotic course.      3/20/2019: Patient reports that he has been taking the Augmentin with some improvement.  However he continues to have shortness of breath, reduced exercise tolerance and pleuritic chest pain.  It was ongoing symptoms the Augmentin was extended for 2 weeks with plan for repeat CT chest.     Today, 4/10/2019: Patient reports feeling well.  He completed the Augmentin 4 days ago.  He has no more cough, SOB, or pleuritic chest pain.  CT was reviewed and with no new concerning findings.  The previously biopsied left upper lobe nodule is no longer seen.  There are some streaky linear opacities in the lower lung could be related to new disease or interval intervention.  No new lung nodules appreciated.  No new lymphadenopathy.     ROS  Review of Systems  "  Constitutional: Negative for chills, fever and malaise/fatigue.   Respiratory: Negative for cough, hemoptysis, sputum production and shortness of breath.    Cardiovascular: Negative for chest pain.   Gastrointestinal: Negative for abdominal pain, diarrhea, nausea and vomiting.   Skin: Negative for rash.   Neurological: Negative for weakness.         Past Medical History:   Diagnosis Date   • Fever    • GERD (gastroesophageal reflux disease)    • Heart burn    • Hypertension    • Indigestion    • Kidney stone    • Renal disorder     stones       Social History   Substance Use Topics   • Smoking status: Former Smoker     Packs/day: 0.50     Years: 4.00     Types: Cigarettes     Quit date: 10/20/1985   • Smokeless tobacco: Never Used   • Alcohol use Yes      Comment: 3 a day       Allergies: Codeine    Pt's medication and problem list reviewed.     Objective:     PE:  /78 (BP Location: Right arm, Patient Position: Sitting, BP Cuff Size: Adult)   Pulse 70   Temp 36.2 °C (97.2 °F) (Temporal)   Ht 1.803 m (5' 11\")   Wt 97.5 kg (215 lb)   SpO2 94%   BMI 29.99 kg/m²     Vital signs reviewed    Constitutional: Appears well-developed and well-nourished. No acute distress.  Speech fluent.    Eyes: Conjunctivae normal and EOM are normal. Pupils are equal, round, and reactive to light.   ENMT: Lips without lesions, good dentition.  Oropharynx is clear and moist.  Neck: Trachea midline. Normal range of motion. Neck supple. No masses.  No JVD.  Cardiovascular: Normal rate, regular rhythm, normal heart sounds and intact distal pulses. No murmur, gallop, or friction rub. No edema.  Respiratory: No respiratory distress, unlabored respiratory effort.  Lungs clear to auscultation bilaterally. No wheezes or rales.   Abdomen: Soft, non tender, non-distended. BS + x 4. No masses or hepatosplenomegaly.   Musculoskeletal: Steady gait.  Normal range of motion.  No joint or bone tenderness, swelling, erythema or deformity.  No " clubbing or cyanosis.  Skin: Warm and dry. Good turgor. No visible rashes or lesions.  Neurological: No cranial nerve deficit. Coordination normal.  Sensation intact.  Psychiatric: Alert and oriented to person, place, and time. Normal mood, calm affect.  Normal behavior and judgment.     Labs:  WBC   Date/Time Value Ref Range Status   11/29/2018 03:23 AM 9.4 4.8 - 10.8 K/uL Final     RBC   Date/Time Value Ref Range Status   11/29/2018 03:23 AM 4.52 (L) 4.70 - 6.10 M/uL Final     Hemoglobin   Date/Time Value Ref Range Status   11/29/2018 03:23 AM 14.4 14.0 - 18.0 g/dL Final     Hematocrit   Date/Time Value Ref Range Status   11/29/2018 03:23 AM 41.8 (L) 42.0 - 52.0 % Final     MCV   Date/Time Value Ref Range Status   11/29/2018 03:23 AM 92.5 81.4 - 97.8 fL Final     MCH   Date/Time Value Ref Range Status   11/29/2018 03:23 AM 31.9 27.0 - 33.0 pg Final     MCHC   Date/Time Value Ref Range Status   11/29/2018 03:23 AM 34.4 33.7 - 35.3 g/dL Final     MPV   Date/Time Value Ref Range Status   11/29/2018 03:23 AM 9.2 9.0 - 12.9 fL Final        Sodium   Date/Time Value Ref Range Status   11/29/2018 03:23  135 - 145 mmol/L Final     Potassium   Date/Time Value Ref Range Status   11/29/2018 03:23 AM 4.3 3.6 - 5.5 mmol/L Final     Chloride   Date/Time Value Ref Range Status   11/29/2018 03:23  96 - 112 mmol/L Final     Co2   Date/Time Value Ref Range Status   11/29/2018 03:23 AM 26 20 - 33 mmol/L Final     Glucose   Date/Time Value Ref Range Status   11/29/2018 03:23 AM 99 65 - 99 mg/dL Final     Bun   Date/Time Value Ref Range Status   11/29/2018 03:23 AM 14 8 - 22 mg/dL Final     Creatinine   Date/Time Value Ref Range Status   11/29/2018 03:23 AM 1.04 0.50 - 1.40 mg/dL Final     Bun-Creatinine Ratio   Date/Time Value Ref Range Status   01/12/2015 04:03 PM 12 9 - 20 Final       Alkaline Phosphatase   Date/Time Value Ref Range Status   11/29/2018 03:23 AM 96 30 - 99 U/L Final     AST(SGOT)   Date/Time Value Ref  Range Status   11/29/2018 03:23 AM 18 12 - 45 U/L Final     ALT(SGPT)   Date/Time Value Ref Range Status   11/29/2018 03:23 AM 25 2 - 50 U/L Final     Total Bilirubin   Date/Time Value Ref Range Status   11/29/2018 03:23 AM 0.5 0.1 - 1.5 mg/dL Final        No results found for: CPKTOTAL     Assessment and Plan:   The following treatment plan was discussed with patient at length:    1. Abscess of upper lobe of left lung without pneumonia (HCC)     2. Pleurodynia     3. Multiple lung nodules on CT       --- As patient symptoms have resolved and he has had adequate antibiotic course.  We will plan on no further antibiotics.  CT chest no acute findings largely resolved and no significant new concerning finding.   --- Follow-up with pulmonology as planned and also discussed CT chest with pulmonologist typically regarding the streaky opacities.  --- No further ID follow-up unless new concerns      Nitza Bell M.D.       Please note that this dictation was created using voice recognition software. I have  worked with technical experts from [employer] to optimize the interface.  I have made every reasonable attempt to correct obvious errors, but there may be errors of grammar and possibly content that I did not discover before finalizing the note.

## 2019-04-24 ENCOUNTER — TELEPHONE (OUTPATIENT)
Dept: PULMONOLOGY | Facility: HOSPICE | Age: 59
End: 2019-04-24

## 2019-04-24 NOTE — TELEPHONE ENCOUNTER
Patient called states he has recently completed a Chest CT on 04/08/19 ordered by  .    Patient was also ordered a CXR by  on 02/26/19, patient is wondering if he still needs to complete CXR or if Chest CT would be okay to use.     Last Seen 02/26/19    Plan:  The patient lung biopsy is consistent with an abscess (septic emboli) secondary to his ENT infection ? Jugular vein thrombophlebitis.  Cultures confirmed fusobacterium infection.  I have contacted infectious disease directly for consultation and antibiotic management.  Return in about 8 weeks (around 4/23/2019) for with CXR    Next OV 04/30/19      Please Advise. Patient would like a call back and states to leave a voicemail due to having his phone in his locker while he's at work.     Patients phone number [phone] (home)

## 2019-04-30 ENCOUNTER — APPOINTMENT (OUTPATIENT)
Dept: RADIOLOGY | Facility: IMAGING CENTER | Age: 59
End: 2019-04-30
Attending: INTERNAL MEDICINE
Payer: COMMERCIAL

## 2019-04-30 ENCOUNTER — OFFICE VISIT (OUTPATIENT)
Dept: PULMONOLOGY | Facility: HOSPICE | Age: 59
End: 2019-04-30
Payer: COMMERCIAL

## 2019-04-30 VITALS
TEMPERATURE: 97.2 F | HEART RATE: 76 BPM | DIASTOLIC BLOOD PRESSURE: 90 MMHG | WEIGHT: 221 LBS | OXYGEN SATURATION: 93 % | RESPIRATION RATE: 16 BRPM | BODY MASS INDEX: 30.94 KG/M2 | SYSTOLIC BLOOD PRESSURE: 162 MMHG | HEIGHT: 71 IN

## 2019-04-30 DIAGNOSIS — R91.8 PULMONARY NODULES: ICD-10-CM

## 2019-04-30 DIAGNOSIS — J85.1 ABSCESS OF LEFT LUNG WITH PNEUMONIA, UNSPECIFIED PART OF LUNG (HCC): ICD-10-CM

## 2019-04-30 DIAGNOSIS — R93.89 NONSPECIFIC ABNORMAL FINDINGS ON RADIOLOGICAL AND EXAMINATION OF INTRATHORACIC ORGANS: ICD-10-CM

## 2019-04-30 PROCEDURE — 99214 OFFICE O/P EST MOD 30 MIN: CPT | Performed by: INTERNAL MEDICINE

## 2019-04-30 NOTE — PROGRESS NOTES
Chief Complaint   Patient presents with   • Follow-Up     Abscess of left lung with pneumonia, unspecified part of lung (HCC)       HPI: This patient is a 59 y.o. Male who returns for lung abscess.  He was hospitalized November 20, 2018 for cellulitis of the right jaw.  He was treated with Augmentin with resolution of symptoms.  His neck CAT scan had incidental finding of pulmonary nodules.  Subsequent chest CAT scan with contrast November 29, 2018 showed bilateral, scattered pulmonary nodules, the largest measuring 1.5 cm in the left upper lobe with cavitation. No infiltrates or gross lymphadenopathy was appreciated.  The patient has <5 PK YRs of smoking remotely.  His QuantiFERON TB Gold test is negative. He lived in [place] in the past and was treated with 9 months of INH for +PPD test, although subsequent QuantiFERON TB Gold tests have been negative. Denies IVDU. Follow-up chest CT showed interval resolution of the majority of nodules, however persistent subpleural left upper lobe nodule measuring 1.5 x 1.2 cm, now with no cavitation. He subsequently underwent CT-guided biopsy on February 7, 2019 with benign lung tissue noted, and no evidence of malignancy.  Cultures grew fusobacterium.  He was referred to infectious disease and treated with a total of 4 weeks of Augmentin.  He feels well, denies shortness of breath, cough, chest pain, fevers or chills he is incentive spirometry has improved now at 3700 mL.  Follow-up chest CAT scan April 8, 2019 was reviewed and shows resolution of the cavitary nodule, with left pleural thickening/fluid with hemidiaphragm elevation.      Past Medical History:   Diagnosis Date   • Fever    • GERD (gastroesophageal reflux disease)    • Heart burn    • Hypertension    • Indigestion    • Kidney stone    • Renal disorder     stones       Social History     Social History   • Marital status: Single     Spouse name: N/A   • Number of children: N/A   • Years of education: N/A      Occupational History   • Not on file.     Social History Main Topics   • Smoking status: Former Smoker     Packs/day: 0.50     Years: 4.00     Types: Cigarettes     Quit date: 10/20/1985   • Smokeless tobacco: Never Used   • Alcohol use Yes      Comment: 3 a day   • Drug use: No   • Sexual activity: Not on file     Other Topics Concern   • Not on file     Social History Narrative   • No narrative on file       Family History   Problem Relation Age of Onset   • No Known Problems Mother    • Heart Disease Father         Passed at 49 from MI   • Heart Disease Step-Brother    • Lung Disease Maternal Grandmother        Current Outpatient Prescriptions on File Prior to Visit   Medication Sig Dispense Refill   • lisinopril (PRINIVIL) 20 MG Tab Take 1 Tab by mouth every day. 90 Tab 0   • simvastatin (ZOCOR) 40 MG Tab Take 1 Tab by mouth every evening. 90 Tab 3   • aspirin EC (ECOTRIN) 81 MG Tablet Delayed Response Take 81 mg by mouth every bedtime.     • ranitidine (ZANTAC) 75 MG tablet Take 35 mg by mouth every evening.     • clonazePAM (KLONOPIN) 0.5 MG Tab      • levoFLOXacin (LEVAQUIN) 750 MG tablet        No current facility-administered medications on file prior to visit.        Allergies: Codeine    ROS:   Constitutional: Denies fevers, chills, night sweats, fatigue or weight loss  Eyes: Denies vision loss, pain, drainage, double vision  Ears, Nose, Throat: Denies earache, difficulty hearing, tinnitus, nasal congestion, hoarseness  Cardiovascular: Denies chest pain, tightness, palpitations, orthopnea or edema  Respiratory: Denies shortness of breath, cough, wheezing, hemoptysis  Sleep: Denies daytime sleepiness, snoring, apneas, insomnia, morning headaches  GI: Denies heartburn, dysphagia, nausea, abdominal pain, diarrhea or constipation  : Denies frequent urination, hematuria, discharge or painful urination  Musculoskeletal: Denies back pain, painful joints, sore muscles  Neurological: Denies weakness or  "headaches  Skin: No rashes    BP (!) 162/90 (BP Location: Left arm, Patient Position: Sitting, BP Cuff Size: Adult)   Pulse 76   Temp 36.2 °C (97.2 °F) (Temporal)   Resp 16   Ht 1.803 m (5' 11\")   Wt 100.2 kg (221 lb)   SpO2 93%     Physical Exam:  Appearance: Well-nourished, well-developed, in no acute distress  HEENT: Normocephalic, atraumatic, white sclera, PERRLA, oropharynx clear  Neck: No adenopathy or masses  Respiratory: no intercostal retractions or accessory muscle use  Lungs auscultation: Clear to auscultation bilaterally  Cardiovascular: Regular rate rhythm. No murmurs, rubs or gallops.  No LE edema  Abdomen: soft, nondistended  Gait: Normal  Digits: No clubbing, cyanosis  Motor: No focal deficits  Orientation: Oriented to time, person and place    Diagnosis:  1. Abscess of left lung with pneumonia, unspecified part of lung (HCC)  CT-CHEST (THORAX) W/O   2. Nonspecific abnormal findings on radiological and examination of intrathoracic organs     3. Pulmonary nodules      -resolved       Plan:  The patient's lung abscess has resolved.  He is asymptomatic and no further antimicrobials are warranted.  His chest CAT scan shows resolution of cavitary nodule with mild LLL pleuroparenchymal atelectasis.  Recommend chest CAT scan in 6-months for follow up, sooner if he develops any respiratory or constitutional symptomatology.  Return in about 6 months (around 10/30/2019).      "

## 2019-11-18 ENCOUNTER — HOSPITAL ENCOUNTER (OUTPATIENT)
Dept: RADIOLOGY | Facility: MEDICAL CENTER | Age: 59
End: 2019-11-18
Attending: INTERNAL MEDICINE
Payer: COMMERCIAL

## 2019-11-18 DIAGNOSIS — J85.1 ABSCESS OF LEFT LUNG WITH PNEUMONIA, UNSPECIFIED PART OF LUNG (HCC): ICD-10-CM

## 2019-11-18 PROCEDURE — 71250 CT THORAX DX C-: CPT

## 2019-11-19 ENCOUNTER — OFFICE VISIT (OUTPATIENT)
Dept: PULMONOLOGY | Facility: HOSPICE | Age: 59
End: 2019-11-19
Payer: COMMERCIAL

## 2019-11-19 VITALS
HEART RATE: 77 BPM | WEIGHT: 228.25 LBS | DIASTOLIC BLOOD PRESSURE: 76 MMHG | BODY MASS INDEX: 31.95 KG/M2 | OXYGEN SATURATION: 96 % | SYSTOLIC BLOOD PRESSURE: 132 MMHG | HEIGHT: 71 IN

## 2019-11-19 DIAGNOSIS — J85.1 ABSCESS OF LEFT LUNG WITH PNEUMONIA, UNSPECIFIED PART OF LUNG (HCC): ICD-10-CM

## 2019-11-19 DIAGNOSIS — R91.8 PULMONARY NODULES: ICD-10-CM

## 2019-11-19 PROCEDURE — 99214 OFFICE O/P EST MOD 30 MIN: CPT | Performed by: INTERNAL MEDICINE

## 2019-11-19 NOTE — PROGRESS NOTES
Chief Complaint   Patient presents with   • Follow-Up     Abscess of left lung. Last seen 04/30/19   • Results     CT-Chest 11/18/19     HPI: This patient is a 59 y.o. Male who returns for lung abscess. He was hospitalized November 20, 2018 for cellulitis of the jaw, treated with Augmentin, with resolution of symptoms.  His neck CAT scan had incidental finding of pulmonary nodules. Subsequent chest CAT scan with contrast November 29, 2018 showed bilateral, scattered cavitary pulmonary nodules, the largest measuring 1.5 cm. No infiltrates or gross lymphadenopathy was appreciated.  The patient has <5 PK YRs of smoking remotely.  His QuantiFERON TB Gold test is negative. He lived in [place] in the past and was treated with 9 months of INH for +PPD test, although subsequent QuantiFERON TB Gold tests have been negative. Denies IVDU. Follow-up chest CT showed interval resolution of the majority of nodules, however persistent subpleural left upper lobe nodule measuring 1.5 x 1.2 cm, with no cavitation. He subsequently underwent CT-guided biopsy on February 7, 2019 with benign lung tissue noted, and no evidence of malignancy.  Cultures grew fusobacterium. He was referred to infectious disease and treated with a total of 4 weeks of Augmentin.    He feels well, denies shortness of breath, cough, chest pain, fevers or chills. His incentive spirometry has improved now at 4000 mL.  Follow-up chest CAT scan April 8, 2019 showed resolution of the cavitary nodule, with left pleural thickening/fluid.  His CAT scan November 18, 2019 showed significant improvement, with now residual mild left lower lobe scarring.  Small 3 to 4 mm fissural nodules are stable.      Past Medical History:   Diagnosis Date   • Fever    • GERD (gastroesophageal reflux disease)    • Heart burn    • Hypertension    • Indigestion    • Kidney stone    • Renal disorder     stones       Social History     Socioeconomic History   • Marital status: Single     Spouse  name: Not on file   • Number of children: Not on file   • Years of education: Not on file   • Highest education level: Not on file   Occupational History   • Not on file   Social Needs   • Financial resource strain: Not on file   • Food insecurity:     Worry: Not on file     Inability: Not on file   • Transportation needs:     Medical: Not on file     Non-medical: Not on file   Tobacco Use   • Smoking status: Former Smoker     Packs/day: 0.50     Years: 4.00     Pack years: 2.00     Types: Cigarettes     Last attempt to quit: 10/20/1985     Years since quittin.1   • Smokeless tobacco: Never Used   Substance and Sexual Activity   • Alcohol use: Yes     Comment: 3 a day   • Drug use: No     Types: Marijuana   • Sexual activity: Not on file   Lifestyle   • Physical activity:     Days per week: Not on file     Minutes per session: Not on file   • Stress: Not on file   Relationships   • Social connections:     Talks on phone: Not on file     Gets together: Not on file     Attends Sikhism service: Not on file     Active member of club or organization: Not on file     Attends meetings of clubs or organizations: Not on file     Relationship status: Not on file   • Intimate partner violence:     Fear of current or ex partner: Not on file     Emotionally abused: Not on file     Physically abused: Not on file     Forced sexual activity: Not on file   Other Topics Concern   • Not on file   Social History Narrative   • Not on file       Family History   Problem Relation Age of Onset   • No Known Problems Mother    • Heart Disease Father         Passed at 49 from MI   • Heart Disease Step-Brother    • Lung Disease Maternal Grandmother        Current Outpatient Medications on File Prior to Visit   Medication Sig Dispense Refill   • clonazePAM (KLONOPIN) 0.5 MG Tab      • lisinopril (PRINIVIL) 20 MG Tab Take 1 Tab by mouth every day. 90 Tab 0   • simvastatin (ZOCOR) 40 MG Tab Take 1 Tab by mouth every evening. 90 Tab 3   •  "ranitidine (ZANTAC) 75 MG tablet Take 35 mg by mouth every evening.     • levoFLOXacin (LEVAQUIN) 750 MG tablet      • aspirin EC (ECOTRIN) 81 MG Tablet Delayed Response Take 81 mg by mouth every bedtime.       No current facility-administered medications on file prior to visit.        Allergies: Codeine    ROS:   Constitutional: Denies fevers, chills, night sweats, fatigue or weight loss  Eyes: Denies vision loss, pain, drainage, double vision  Ears, Nose, Throat: Denies earache, difficulty hearing, tinnitus, nasal congestion, hoarseness  Cardiovascular: Denies chest pain, tightness, palpitations, orthopnea or edema  Respiratory: Denies shortness of breath, cough, wheezing, hemoptysis  Sleep: Denies daytime sleepiness, snoring, apneas, insomnia, morning headaches  GI: Denies heartburn, dysphagia, nausea, abdominal pain, diarrhea or constipation  : Denies frequent urination, hematuria, discharge or painful urination  Musculoskeletal: Denies back pain, painful joints, sore muscles  Neurological: Denies weakness or headaches  Skin: No rashes    /76 (BP Location: Left arm, Patient Position: Sitting, BP Cuff Size: Large adult)   Pulse 77   Ht 1.803 m (5' 11\")   Wt 103.5 kg (228 lb 4 oz)   SpO2 96%     Physical Exam:  Appearance: Well-nourished, well-developed, in no acute distress  HEENT: Normocephalic, atraumatic, white sclera, PERRLA, oropharynx clear  Neck: No adenopathy or masses  Respiratory: no intercostal retractions or accessory muscle use  Lungs auscultation: Clear to auscultation bilaterally  Cardiovascular: Regular rate rhythm. No murmurs, rubs or gallops.  No LE edema  Abdomen: soft, nondistended  Gait: Normal  Digits: No clubbing, cyanosis  Motor: No focal deficits  Orientation: Oriented to time, person and place    Diagnosis:  1. Abscess of left lung with pneumonia, unspecified part of lung (HCC)      -resolved   2. Pulmonary nodules      -resolved       Plan:  The patient's lung abscess has " resolved.  He is asymptomatic; chest CAT scan shows residual mild scarring only.  He will be discharged from pulmonary clinic and RTC prn.  Return if symptoms worsen or fail to improve.

## 2023-12-05 ENCOUNTER — APPOINTMENT (OUTPATIENT)
Dept: RADIOLOGY | Facility: MEDICAL CENTER | Age: 63
End: 2023-12-05
Attending: EMERGENCY MEDICINE
Payer: COMMERCIAL

## 2023-12-05 ENCOUNTER — HOSPITAL ENCOUNTER (EMERGENCY)
Facility: MEDICAL CENTER | Age: 63
End: 2023-12-05
Attending: EMERGENCY MEDICINE
Payer: COMMERCIAL

## 2023-12-05 VITALS
HEIGHT: 70 IN | TEMPERATURE: 97.5 F | HEART RATE: 86 BPM | WEIGHT: 228 LBS | DIASTOLIC BLOOD PRESSURE: 63 MMHG | BODY MASS INDEX: 32.64 KG/M2 | RESPIRATION RATE: 51 BRPM | OXYGEN SATURATION: 93 % | SYSTOLIC BLOOD PRESSURE: 139 MMHG

## 2023-12-05 DIAGNOSIS — R10.9 FLANK PAIN: ICD-10-CM

## 2023-12-05 DIAGNOSIS — Z87.442 HISTORY OF KIDNEY STONES: ICD-10-CM

## 2023-12-05 LAB
ALBUMIN SERPL BCP-MCNC: 4.2 G/DL (ref 3.2–4.9)
ALBUMIN/GLOB SERPL: 1.4 G/DL
ALP SERPL-CCNC: 89 U/L (ref 30–99)
ALT SERPL-CCNC: 20 U/L (ref 2–50)
ANION GAP SERPL CALC-SCNC: 15 MMOL/L (ref 7–16)
APPEARANCE UR: CLEAR
AST SERPL-CCNC: 23 U/L (ref 12–45)
BASOPHILS # BLD AUTO: 0.2 % (ref 0–1.8)
BASOPHILS # BLD: 0.02 K/UL (ref 0–0.12)
BILIRUB SERPL-MCNC: 0.7 MG/DL (ref 0.1–1.5)
BILIRUB UR QL STRIP.AUTO: NEGATIVE
BUN SERPL-MCNC: 19 MG/DL (ref 8–22)
CALCIUM ALBUM COR SERPL-MCNC: 8.9 MG/DL (ref 8.5–10.5)
CALCIUM SERPL-MCNC: 9.1 MG/DL (ref 8.5–10.5)
CHLORIDE SERPL-SCNC: 101 MMOL/L (ref 96–112)
CO2 SERPL-SCNC: 22 MMOL/L (ref 20–33)
COLOR UR: YELLOW
CREAT SERPL-MCNC: 1.14 MG/DL (ref 0.5–1.4)
EOSINOPHIL # BLD AUTO: 0 K/UL (ref 0–0.51)
EOSINOPHIL NFR BLD: 0 % (ref 0–6.9)
ERYTHROCYTE [DISTWIDTH] IN BLOOD BY AUTOMATED COUNT: 38.8 FL (ref 35.9–50)
GFR SERPLBLD CREATININE-BSD FMLA CKD-EPI: 72 ML/MIN/1.73 M 2
GLOBULIN SER CALC-MCNC: 2.9 G/DL (ref 1.9–3.5)
GLUCOSE SERPL-MCNC: 161 MG/DL (ref 65–99)
GLUCOSE UR STRIP.AUTO-MCNC: NEGATIVE MG/DL
HCT VFR BLD AUTO: 41.7 % (ref 42–52)
HGB BLD-MCNC: 14.9 G/DL (ref 14–18)
IMM GRANULOCYTES # BLD AUTO: 0.05 K/UL (ref 0–0.11)
IMM GRANULOCYTES NFR BLD AUTO: 0.4 % (ref 0–0.9)
INR PPP: 1.06 (ref 0.87–1.13)
KETONES UR STRIP.AUTO-MCNC: 40 MG/DL
LACTATE SERPL-SCNC: 1.7 MMOL/L (ref 0.5–2)
LEUKOCYTE ESTERASE UR QL STRIP.AUTO: NEGATIVE
LIPASE SERPL-CCNC: 26 U/L (ref 11–82)
LYMPHOCYTES # BLD AUTO: 0.82 K/UL (ref 1–4.8)
LYMPHOCYTES NFR BLD: 6.4 % (ref 22–41)
MCH RBC QN AUTO: 32.3 PG (ref 27–33)
MCHC RBC AUTO-ENTMCNC: 35.7 G/DL (ref 32.3–36.5)
MCV RBC AUTO: 90.5 FL (ref 81.4–97.8)
MICRO URNS: ABNORMAL
MONOCYTES # BLD AUTO: 0.76 K/UL (ref 0–0.85)
MONOCYTES NFR BLD AUTO: 5.9 % (ref 0–13.4)
NEUTROPHILS # BLD AUTO: 11.14 K/UL (ref 1.82–7.42)
NEUTROPHILS NFR BLD: 87.1 % (ref 44–72)
NITRITE UR QL STRIP.AUTO: NEGATIVE
NRBC # BLD AUTO: 0 K/UL
NRBC BLD-RTO: 0 /100 WBC (ref 0–0.2)
PH UR STRIP.AUTO: 5.5 [PH] (ref 5–8)
PLATELET # BLD AUTO: 231 K/UL (ref 164–446)
PMV BLD AUTO: 8.9 FL (ref 9–12.9)
POTASSIUM SERPL-SCNC: 4.2 MMOL/L (ref 3.6–5.5)
PROT SERPL-MCNC: 7.1 G/DL (ref 6–8.2)
PROT UR QL STRIP: NEGATIVE MG/DL
PROTHROMBIN TIME: 13.9 SEC (ref 12–14.6)
RBC # BLD AUTO: 4.61 M/UL (ref 4.7–6.1)
RBC UR QL AUTO: NEGATIVE
SODIUM SERPL-SCNC: 138 MMOL/L (ref 135–145)
SP GR UR STRIP.AUTO: 1.02
UROBILINOGEN UR STRIP.AUTO-MCNC: 0.2 MG/DL
WBC # BLD AUTO: 12.8 K/UL (ref 4.8–10.8)

## 2023-12-05 PROCEDURE — 83605 ASSAY OF LACTIC ACID: CPT

## 2023-12-05 PROCEDURE — 36415 COLL VENOUS BLD VENIPUNCTURE: CPT

## 2023-12-05 PROCEDURE — 85610 PROTHROMBIN TIME: CPT

## 2023-12-05 PROCEDURE — 96374 THER/PROPH/DIAG INJ IV PUSH: CPT

## 2023-12-05 PROCEDURE — 76775 US EXAM ABDO BACK WALL LIM: CPT

## 2023-12-05 PROCEDURE — 700105 HCHG RX REV CODE 258: Performed by: EMERGENCY MEDICINE

## 2023-12-05 PROCEDURE — 87040 BLOOD CULTURE FOR BACTERIA: CPT

## 2023-12-05 PROCEDURE — 80053 COMPREHEN METABOLIC PANEL: CPT

## 2023-12-05 PROCEDURE — 85025 COMPLETE CBC W/AUTO DIFF WBC: CPT

## 2023-12-05 PROCEDURE — 96375 TX/PRO/DX INJ NEW DRUG ADDON: CPT

## 2023-12-05 PROCEDURE — 700111 HCHG RX REV CODE 636 W/ 250 OVERRIDE (IP): Mod: JZ | Performed by: EMERGENCY MEDICINE

## 2023-12-05 PROCEDURE — 83690 ASSAY OF LIPASE: CPT

## 2023-12-05 PROCEDURE — 99285 EMERGENCY DEPT VISIT HI MDM: CPT

## 2023-12-05 PROCEDURE — 81003 URINALYSIS AUTO W/O SCOPE: CPT

## 2023-12-05 PROCEDURE — 700111 HCHG RX REV CODE 636 W/ 250 OVERRIDE (IP)

## 2023-12-05 RX ORDER — TAMSULOSIN HYDROCHLORIDE 0.4 MG/1
0.4 CAPSULE ORAL DAILY
Qty: 7 CAPSULE | Refills: 0 | Status: ON HOLD | OUTPATIENT
Start: 2023-12-05 | End: 2023-12-06

## 2023-12-05 RX ORDER — KETOROLAC TROMETHAMINE 30 MG/ML
15 INJECTION, SOLUTION INTRAMUSCULAR; INTRAVENOUS ONCE
Status: DISCONTINUED | OUTPATIENT
Start: 2023-12-05 | End: 2023-12-05

## 2023-12-05 RX ORDER — CEPHALEXIN 500 MG/1
500 CAPSULE ORAL EVERY 8 HOURS
COMMUNITY
Start: 2023-12-04

## 2023-12-05 RX ORDER — SODIUM CHLORIDE, SODIUM LACTATE, POTASSIUM CHLORIDE, CALCIUM CHLORIDE 600; 310; 30; 20 MG/100ML; MG/100ML; MG/100ML; MG/100ML
1000 INJECTION, SOLUTION INTRAVENOUS ONCE
Status: COMPLETED | OUTPATIENT
Start: 2023-12-05 | End: 2023-12-05

## 2023-12-05 RX ORDER — DIAZEPAM 5 MG/ML
2.5 INJECTION, SOLUTION INTRAMUSCULAR; INTRAVENOUS ONCE
Status: COMPLETED | OUTPATIENT
Start: 2023-12-05 | End: 2023-12-05

## 2023-12-05 RX ORDER — KETOROLAC TROMETHAMINE 30 MG/ML
15 INJECTION, SOLUTION INTRAMUSCULAR; INTRAVENOUS ONCE
Status: COMPLETED | OUTPATIENT
Start: 2023-12-05 | End: 2023-12-05

## 2023-12-05 RX ORDER — CHOLECALCIFEROL (VITAMIN D3) 125 MCG
5 CAPSULE ORAL NIGHTLY
COMMUNITY

## 2023-12-05 RX ORDER — MORPHINE SULFATE 15 MG/1
7.5 TABLET ORAL EVERY 6 HOURS PRN
Qty: 6 TABLET | Refills: 0 | Status: SHIPPED | OUTPATIENT
Start: 2023-12-05 | End: 2023-12-17

## 2023-12-05 RX ORDER — ONDANSETRON 4 MG/1
4 TABLET, ORALLY DISINTEGRATING ORAL ONCE
Status: COMPLETED | OUTPATIENT
Start: 2023-12-05 | End: 2023-12-05

## 2023-12-05 RX ADMIN — ONDANSETRON 4 MG: 4 TABLET, ORALLY DISINTEGRATING ORAL at 12:31

## 2023-12-05 RX ADMIN — DIAZEPAM 2.5 MG: 5 INJECTION, SOLUTION INTRAMUSCULAR; INTRAVENOUS at 13:33

## 2023-12-05 RX ADMIN — SODIUM CHLORIDE, POTASSIUM CHLORIDE, SODIUM LACTATE AND CALCIUM CHLORIDE 1000 ML: 600; 310; 30; 20 INJECTION, SOLUTION INTRAVENOUS at 13:21

## 2023-12-05 RX ADMIN — KETOROLAC TROMETHAMINE 15 MG: 30 INJECTION, SOLUTION INTRAMUSCULAR; INTRAVENOUS at 13:32

## 2023-12-05 RX ADMIN — FENTANYL CITRATE 50 MCG: 50 INJECTION, SOLUTION INTRAMUSCULAR; INTRAVENOUS at 13:18

## 2023-12-05 ASSESSMENT — LIFESTYLE VARIABLES
DOES PATIENT WANT TO STOP DRINKING: NO
DO YOU DRINK ALCOHOL: NO

## 2023-12-05 NOTE — DISCHARGE INSTRUCTIONS
As we discussed, your tests here were reassuring.  We do recommend continuing 600 mg of ibuprofen at mealtimes and bedtime for the next day or 2 until you are sure you are feeling better.  We have prescribed a short course of additional pain medications as needed for breakthrough pain.  Please schedule follow-up with urology.  Return here for severe or worsening symptoms in the meantime.

## 2023-12-05 NOTE — ED NOTES
Received orders for DC. PIV removed and dressing applied. Educated regarding pain medication prescription and informed consent for narcotic pain medication. Discussed all risks of opoid medications. Verbalizes understanding. Aware to f/u with urology. Ambulatory to lobby with steady gait.

## 2023-12-05 NOTE — ED PROVIDER NOTES
"ED Provider Note    CHIEF COMPLAINT  Chief Complaint   Patient presents with    Flank Pain     R sided flank pain, onset Sunday   Had imaging done showing 8mm kidney stone  Hx of kidney stones   States symptoms worsened today, endorses n/v       EXTERNAL RECORDS REVIEWED  Outpatient records reviewed.  Triage note reports the patient had imaging done showing an 8 mm kidney stone, but I do not see any recent associated outpatient or inpatient visits, or any imaging results correlating to that report.  Further information obtained from the patient at bedside.  History does show ureteroscopy, laser trip C, and stent placement in 2015.    HPI/ROS  LIMITATION TO HISTORY   Select: Discomfort.    OUTSIDE HISTORIAN(S):      Benito Davis is a 63 y.o. male who presents to the emergency department reporting flank pain from a known kidney stone.  He reports a history of chronic intermittent kidney stones, with prior stent placement.  He reports that he had pain on Sunday, was seen at [facility], started on Flomax and antibiotics because \"my white cells were elevated,\" has been asymptomatic until overnight.  He says that he saw his urology team yesterday.  He has been seen by Dr. Fitzgerald in the past, and reports that he saw Dr. Fitzgerald's PA yesterday.  He reports occasional nausea and vomiting, no fevers or chills.  He reports that right CVA pain radiates to the right lower quadrant and sometimes the testicle.    PAST MEDICAL HISTORY   has a past medical history of Fever, GERD (gastroesophageal reflux disease), Heart burn, Hypertension, Indigestion, Kidney stone, and Renal disorder.    SURGICAL HISTORY   has a past surgical history that includes other abdominal surgery (1990); other (2012); cystoscopy (10/2/2015); ureteroscopy (Right, 10/2/2015); lasertripsy (10/2/2015); and stent placement (Left, 10/2/2015).    FAMILY HISTORY  Family History   Problem Relation Age of Onset    No Known Problems Mother  " "   Heart Disease Father         Passed at 49 from MI    Heart Disease Step-Brother     Lung Disease Maternal Grandmother        SOCIAL HISTORY  Social History     Tobacco Use    Smoking status: Former     Current packs/day: 0.00     Average packs/day: 0.5 packs/day for 4.0 years (2.0 ttl pk-yrs)     Types: Cigarettes     Start date: 10/20/1981     Quit date: 10/20/1985     Years since quittin.1    Smokeless tobacco: Never   Vaping Use    Vaping Use: Never used   Substance and Sexual Activity    Alcohol use: Yes     Comment: 3 a day    Drug use: No     Types: Marijuana    Sexual activity: Not on file       CURRENT MEDICATIONS  Home Medications       Reviewed by Steve Uribe (Pharmacy Tech) on 23 at 1428  Med List Status: Complete     Medication Last Dose Status   cephALEXin (KEFLEX) 500 MG Cap 2023 Active   lisinopril (PRINIVIL) 20 MG Tab 2023 Active   melatonin 5 mg Tab 2023 Active   multivitamin Tab 2023 Active   Probiotic Product (PROBIOTIC PO) 2023 Active   simvastatin (ZOCOR) 40 MG Tab 2023 Active                    ALLERGIES  Allergies   Allergen Reactions    Codeine Rash       PHYSICAL EXAM  VITAL SIGNS: BP (!) 152/72   Pulse 82   Temp (!) 35.7 °C (96.2 °F) (Temporal)   Resp (!) 22   Ht 1.778 m (5' 10\")   Wt 103 kg (228 lb)   SpO2 97%   BMI 32.71 kg/m²   Pulse ox interpretation: I interpret this pulse ox as normal.  Constitutional: Alert in obvious distress.  HENT: No signs of trauma, Bilateral external ears normal, Nose normal.   Eyes: Pupils are equal and reactive, Conjunctiva normal, Non-icteric.   Neck: Normal range of motion, Supple, No stridor.    Cardiovascular: Normal peripheral perfusion  Thorax & Lungs: Unlabored respirations, equal chest expansion, no accessory muscle use  Abdomen: Non-distended  Skin:  No erythema, No rash.   Back: Right CVA pain  Extremities: No gross deformity  Musculoskeletal: Good range of motion in all major joints. " "  Neurologic: Alert, Normal motor function, No focal deficits noted.   Psychiatric: Affect normal, Judgment normal, Mood normal.      DIAGNOSTIC STUDIES / PROCEDURES    LABS  Labs Reviewed   CBC WITH DIFFERENTIAL - Abnormal; Notable for the following components:       Result Value    WBC 12.8 (*)     RBC 4.61 (*)     Hematocrit 41.7 (*)     MPV 8.9 (*)     Neutrophils-Polys 87.10 (*)     Lymphocytes 6.40 (*)     Neutrophils (Absolute) 11.14 (*)     Lymphs (Absolute) 0.82 (*)     All other components within normal limits   COMP METABOLIC PANEL - Abnormal; Notable for the following components:    Glucose 161 (*)     All other components within normal limits   URINALYSIS - Abnormal; Notable for the following components:    Ketones 40 (*)     All other components within normal limits    Narrative:     If not done within the last 24 hours   LIPASE   PROTHROMBIN TIME    Narrative:     If not done within the last 4 hours  Indicate which anticoagulants the patient is on:->UNKNOWN   ESTIMATED GFR   LACTIC ACID   BLOOD CULTURE    Narrative:     From different peripheral sites, if not done within the last  24 hours (Per Hospital Policy: Only change specimen source to  \"Line\" if specified by physician order)         RADIOLOGY  I have independently interpreted the diagnostic imaging associated with this visit and am waiting the final reading from the radiologist.   My preliminary interpretation is as follows: no hydronephrosis    Radiologist interpretation:   US-RENAL   Final Result      1.  9 mm nonobstructive right renal stone.   2.  No evidence of hydronephrosis.            COURSE & MEDICAL DECISION MAKING    ED Observation Status? Yes; I am placing the patient in to an observation status due to a diagnostic uncertainty as well as therapeutic intensity. Patient placed in observation status at 1:13 PM, 12/5/2023.     Observation plan is as follows: Labs, ultrasound imaging, symptomatic treatment, reassessment.    Upon " Reevaluation, the patient's condition has: Improved; and will be discharged.    Patient discharged from ED Observation status at 2:46 PM (Time) 12/05/23 (Date).     INITIAL ASSESSMENT, COURSE AND PLAN  Care Narrative:     1:10 PM patient evaluated the bedside.  He is in obvious distress from right-sided flank pain.  He reliably reports an 8 mm proximal stone diagnosed on Sunday at [facility] by CT.  He reports a follow-up visit with urology yesterday, but was asymptomatic at the time.  He presents with a return of pain.  He reports that there was some suspicion for infection.  Per protocol, sepsis rule out labs were ordered at triage.  He will be treated with LR bolus, fentanyl, Toradol, Valium for renal colic.  I like to spare him unnecessary CT radiation, so I have ordered a renal ultrasound to assess the degree of hydronephrosis at this time.  We may need urology consultation, depending on imaging results, lab results, and response to symptomatic treatment.    2:34 PM ultrasound does not show any evidence of hydronephrosis, or other abnormalities, other than a nonobstructing renal stone, but no ureteral stone.  Labs did not show signs of urinary tract infection, or microscopic hematuria at this time.    2:46 PM patient just reevaluated the bedside.  He was resting comfortably with his eyes closed.  Pain is resolved.  We discussed that there is no microscopic hematuria, no sign of urinary tract infection, no sign of acute kidney injury, and his ultrasound was normal, with the exception of the nonobstructing renal stone, which he says he knew about.  He is happy to be discharged home.  He will continue ibuprofen every 6 hours for the next day or 2 until he is sure he is feeling better.  Per patient request, I will prescribe a short course of immediate release morphine as needed for any breakthrough pain.  He will call urology to let them know that he was here.      HTN/IDDM FOLLOW UP:  The  patient is referred to a primary physician for blood pressure management, diabetic screening, and for all other preventive health concerns      ADDITIONAL PROBLEM LIST  Chronic as well as acute kidney stone.    DISPOSITION AND DISCUSSIONS    Escalation of care considered, and ultimately not performed:acute inpatient care management, however at this time, the patient is most appropriate for outpatient management.  He is symptomatically resolved, with on emergent findings.  Decision tools and prescription drugs considered including, but not limited to: Pain Medications were prescribed at discharge for breakthrough pain .     The patient will return for new or worsening symptoms and is stable at the time of discharge.    The patient is referred to a primary physician for blood pressure management, diabetic screening, and for all other preventative health concerns.      DISPOSITION:  Patient will be discharged home in stable condition.    FOLLOW UP:  Zaki Fitzgerald M.D.  [address] [facility] [place] [address]  [phone]    Schedule an appointment as soon as possible for a visit         OUTPATIENT MEDICATIONS:  New Prescriptions    MORPHINE (MS IR) 15 MG TABLET    Take 0.5 Tablets by mouth every 6 hours as needed (breakthroug hpain) for up to 12 days.    TAMSULOSIN (FLOMAX) 0.4 MG CAPSULE    Take 1 Capsule by mouth every day for 7 days.         FINAL DIAGNOSIS  1. Flank pain    2. History of kidney stones           Electronically signed by: Aaron Snell M.D., 12/5/2023 1:04 PM

## 2023-12-05 NOTE — ED TRIAGE NOTES
"Chief Complaint   Patient presents with    Flank Pain     R sided flank pain, onset Sunday   Had imaging done showing 8mm kidney stone  Hx of kidney stones   States symptoms worsened today, endorses n/v     BP (!) 172/94   Pulse 98   Temp (!) 35.7 °C (96.2 °F) (Temporal)   Resp 16   Ht 1.778 m (5' 10\")   Wt 103 kg (228 lb)   SpO2 97%   BMI 32.71 kg/m²     Pt made aware of triage process, placed back into lobby, educated pt to tell staff of any worsening of symptoms     "

## 2023-12-05 NOTE — ED NOTES
Med Rec complete per patient   Allergies reviewed  Antibiotics in the past 30 days:yes  Anticoagulant in past 14 days:no  [facility] [address] [facility]    Verified antibiotic with pharmacy

## 2023-12-06 ENCOUNTER — APPOINTMENT (OUTPATIENT)
Dept: RADIOLOGY | Facility: MEDICAL CENTER | Age: 63
End: 2023-12-06
Attending: UROLOGY
Payer: COMMERCIAL

## 2023-12-06 ENCOUNTER — ANESTHESIA (OUTPATIENT)
Dept: SURGERY | Facility: MEDICAL CENTER | Age: 63
End: 2023-12-06
Payer: COMMERCIAL

## 2023-12-06 ENCOUNTER — ANESTHESIA EVENT (OUTPATIENT)
Dept: SURGERY | Facility: MEDICAL CENTER | Age: 63
End: 2023-12-06
Payer: COMMERCIAL

## 2023-12-06 ENCOUNTER — APPOINTMENT (OUTPATIENT)
Dept: RADIOLOGY | Facility: MEDICAL CENTER | Age: 63
End: 2023-12-06
Attending: EMERGENCY MEDICINE
Payer: COMMERCIAL

## 2023-12-06 ENCOUNTER — HOSPITAL ENCOUNTER (EMERGENCY)
Facility: MEDICAL CENTER | Age: 63
End: 2023-12-06
Attending: EMERGENCY MEDICINE
Payer: COMMERCIAL

## 2023-12-06 VITALS
HEART RATE: 73 BPM | SYSTOLIC BLOOD PRESSURE: 168 MMHG | DIASTOLIC BLOOD PRESSURE: 77 MMHG | RESPIRATION RATE: 20 BRPM | WEIGHT: 216.05 LBS | HEIGHT: 70 IN | BODY MASS INDEX: 30.93 KG/M2 | OXYGEN SATURATION: 91 % | TEMPERATURE: 97.7 F

## 2023-12-06 DIAGNOSIS — N20.1 URETERAL STONE: ICD-10-CM

## 2023-12-06 DIAGNOSIS — N13.2 HYDRONEPHROSIS WITH OBSTRUCTING CALCULUS: Primary | ICD-10-CM

## 2023-12-06 PROBLEM — K21.9 GASTROESOPHAGEAL REFLUX DISEASE: Status: ACTIVE | Noted: 2023-12-06

## 2023-12-06 LAB
ALBUMIN SERPL BCP-MCNC: 4.3 G/DL (ref 3.2–4.9)
ALBUMIN/GLOB SERPL: 1.5 G/DL
ALP SERPL-CCNC: 85 U/L (ref 30–99)
ALT SERPL-CCNC: 23 U/L (ref 2–50)
ANION GAP SERPL CALC-SCNC: 13 MMOL/L (ref 7–16)
APPEARANCE UR: CLEAR
AST SERPL-CCNC: 21 U/L (ref 12–45)
BASOPHILS # BLD AUTO: 0.4 % (ref 0–1.8)
BASOPHILS # BLD: 0.04 K/UL (ref 0–0.12)
BILIRUB SERPL-MCNC: 0.5 MG/DL (ref 0.1–1.5)
BILIRUB UR QL STRIP.AUTO: NEGATIVE
BUN SERPL-MCNC: 23 MG/DL (ref 8–22)
CALCIUM ALBUM COR SERPL-MCNC: 8.6 MG/DL (ref 8.5–10.5)
CALCIUM SERPL-MCNC: 8.8 MG/DL (ref 8.5–10.5)
CHLORIDE SERPL-SCNC: 104 MMOL/L (ref 96–112)
CO2 SERPL-SCNC: 22 MMOL/L (ref 20–33)
COLOR UR: YELLOW
CREAT SERPL-MCNC: 1.14 MG/DL (ref 0.5–1.4)
EOSINOPHIL # BLD AUTO: 0.04 K/UL (ref 0–0.51)
EOSINOPHIL NFR BLD: 0.4 % (ref 0–6.9)
ERYTHROCYTE [DISTWIDTH] IN BLOOD BY AUTOMATED COUNT: 38.1 FL (ref 35.9–50)
GFR SERPLBLD CREATININE-BSD FMLA CKD-EPI: 72 ML/MIN/1.73 M 2
GLOBULIN SER CALC-MCNC: 2.9 G/DL (ref 1.9–3.5)
GLUCOSE SERPL-MCNC: 143 MG/DL (ref 65–99)
GLUCOSE UR STRIP.AUTO-MCNC: NEGATIVE MG/DL
HCT VFR BLD AUTO: 40 % (ref 42–52)
HGB BLD-MCNC: 14.4 G/DL (ref 14–18)
IMM GRANULOCYTES # BLD AUTO: 0.04 K/UL (ref 0–0.11)
IMM GRANULOCYTES NFR BLD AUTO: 0.4 % (ref 0–0.9)
KETONES UR STRIP.AUTO-MCNC: 15 MG/DL
LEUKOCYTE ESTERASE UR QL STRIP.AUTO: NEGATIVE
LYMPHOCYTES # BLD AUTO: 1.7 K/UL (ref 1–4.8)
LYMPHOCYTES NFR BLD: 16.1 % (ref 22–41)
MCH RBC QN AUTO: 31.9 PG (ref 27–33)
MCHC RBC AUTO-ENTMCNC: 36 G/DL (ref 32.3–36.5)
MCV RBC AUTO: 88.7 FL (ref 81.4–97.8)
MICRO URNS: ABNORMAL
MONOCYTES # BLD AUTO: 1.13 K/UL (ref 0–0.85)
MONOCYTES NFR BLD AUTO: 10.7 % (ref 0–13.4)
NEUTROPHILS # BLD AUTO: 7.59 K/UL (ref 1.82–7.42)
NEUTROPHILS NFR BLD: 72 % (ref 44–72)
NITRITE UR QL STRIP.AUTO: NEGATIVE
NRBC # BLD AUTO: 0 K/UL
NRBC BLD-RTO: 0 /100 WBC (ref 0–0.2)
PH UR STRIP.AUTO: 5 [PH] (ref 5–8)
PLATELET # BLD AUTO: 223 K/UL (ref 164–446)
PMV BLD AUTO: 9.1 FL (ref 9–12.9)
POTASSIUM SERPL-SCNC: 3.8 MMOL/L (ref 3.6–5.5)
PROT SERPL-MCNC: 7.2 G/DL (ref 6–8.2)
PROT UR QL STRIP: NEGATIVE MG/DL
RBC # BLD AUTO: 4.51 M/UL (ref 4.7–6.1)
RBC UR QL AUTO: NEGATIVE
SODIUM SERPL-SCNC: 139 MMOL/L (ref 135–145)
SP GR UR STRIP.AUTO: 1.02
UROBILINOGEN UR STRIP.AUTO-MCNC: 0.2 MG/DL
WBC # BLD AUTO: 10.5 K/UL (ref 4.8–10.8)

## 2023-12-06 PROCEDURE — 160039 HCHG SURGERY MINUTES - EA ADDL 1 MIN LEVEL 3: Performed by: UROLOGY

## 2023-12-06 PROCEDURE — 96376 TX/PRO/DX INJ SAME DRUG ADON: CPT

## 2023-12-06 PROCEDURE — 700111 HCHG RX REV CODE 636 W/ 250 OVERRIDE (IP): Mod: JZ | Performed by: EMERGENCY MEDICINE

## 2023-12-06 PROCEDURE — 700101 HCHG RX REV CODE 250: Performed by: STUDENT IN AN ORGANIZED HEALTH CARE EDUCATION/TRAINING PROGRAM

## 2023-12-06 PROCEDURE — 700105 HCHG RX REV CODE 258: Performed by: EMERGENCY MEDICINE

## 2023-12-06 PROCEDURE — 700111 HCHG RX REV CODE 636 W/ 250 OVERRIDE (IP): Performed by: STUDENT IN AN ORGANIZED HEALTH CARE EDUCATION/TRAINING PROGRAM

## 2023-12-06 PROCEDURE — 80053 COMPREHEN METABOLIC PANEL: CPT

## 2023-12-06 PROCEDURE — 74177 CT ABD & PELVIS W/CONTRAST: CPT

## 2023-12-06 PROCEDURE — 36415 COLL VENOUS BLD VENIPUNCTURE: CPT

## 2023-12-06 PROCEDURE — 160009 HCHG ANES TIME/MIN: Performed by: UROLOGY

## 2023-12-06 PROCEDURE — C2617 STENT, NON-COR, TEM W/O DEL: HCPCS | Performed by: UROLOGY

## 2023-12-06 PROCEDURE — 94760 N-INVAS EAR/PLS OXIMETRY 1: CPT

## 2023-12-06 PROCEDURE — 160028 HCHG SURGERY MINUTES - 1ST 30 MINS LEVEL 3: Performed by: UROLOGY

## 2023-12-06 PROCEDURE — 81003 URINALYSIS AUTO W/O SCOPE: CPT

## 2023-12-06 PROCEDURE — C1769 GUIDE WIRE: HCPCS | Performed by: UROLOGY

## 2023-12-06 PROCEDURE — 160048 HCHG OR STATISTICAL LEVEL 1-5: Performed by: UROLOGY

## 2023-12-06 PROCEDURE — 96374 THER/PROPH/DIAG INJ IV PUSH: CPT

## 2023-12-06 PROCEDURE — 99291 CRITICAL CARE FIRST HOUR: CPT

## 2023-12-06 PROCEDURE — 96375 TX/PRO/DX INJ NEW DRUG ADDON: CPT

## 2023-12-06 PROCEDURE — C1747 HCHG SHELL REV 278 C1747: HCPCS | Performed by: UROLOGY

## 2023-12-06 PROCEDURE — 160035 HCHG PACU - 1ST 60 MINS PHASE I: Performed by: UROLOGY

## 2023-12-06 PROCEDURE — 700117 HCHG RX CONTRAST REV CODE 255: Performed by: EMERGENCY MEDICINE

## 2023-12-06 PROCEDURE — 700111 HCHG RX REV CODE 636 W/ 250 OVERRIDE (IP)

## 2023-12-06 PROCEDURE — 700105 HCHG RX REV CODE 258: Performed by: STUDENT IN AN ORGANIZED HEALTH CARE EDUCATION/TRAINING PROGRAM

## 2023-12-06 PROCEDURE — 160002 HCHG RECOVERY MINUTES (STAT): Performed by: UROLOGY

## 2023-12-06 PROCEDURE — 85025 COMPLETE CBC W/AUTO DIFF WBC: CPT

## 2023-12-06 DEVICE — STENT UROLOGICAL POLARIS 6X26  ULTRA: Type: IMPLANTABLE DEVICE | Site: PENIS | Status: FUNCTIONAL

## 2023-12-06 RX ORDER — SODIUM CHLORIDE, SODIUM LACTATE, POTASSIUM CHLORIDE, CALCIUM CHLORIDE 600; 310; 30; 20 MG/100ML; MG/100ML; MG/100ML; MG/100ML
INJECTION, SOLUTION INTRAVENOUS
Status: DISCONTINUED | OUTPATIENT
Start: 2023-12-06 | End: 2023-12-06 | Stop reason: SURG

## 2023-12-06 RX ORDER — LIDOCAINE HYDROCHLORIDE 20 MG/ML
INJECTION, SOLUTION EPIDURAL; INFILTRATION; INTRACAUDAL; PERINEURAL PRN
Status: DISCONTINUED | OUTPATIENT
Start: 2023-12-06 | End: 2023-12-06 | Stop reason: SURG

## 2023-12-06 RX ORDER — ONDANSETRON 2 MG/ML
4 INJECTION INTRAMUSCULAR; INTRAVENOUS
Status: DISCONTINUED | OUTPATIENT
Start: 2023-12-06 | End: 2023-12-06 | Stop reason: HOSPADM

## 2023-12-06 RX ORDER — HYDROMORPHONE HYDROCHLORIDE 1 MG/ML
0.4 INJECTION, SOLUTION INTRAMUSCULAR; INTRAVENOUS; SUBCUTANEOUS
Status: DISCONTINUED | OUTPATIENT
Start: 2023-12-06 | End: 2023-12-06 | Stop reason: HOSPADM

## 2023-12-06 RX ORDER — MEPERIDINE HYDROCHLORIDE 25 MG/ML
12.5 INJECTION INTRAMUSCULAR; INTRAVENOUS; SUBCUTANEOUS
Status: DISCONTINUED | OUTPATIENT
Start: 2023-12-06 | End: 2023-12-06 | Stop reason: HOSPADM

## 2023-12-06 RX ORDER — HYDROMORPHONE HYDROCHLORIDE 1 MG/ML
1 INJECTION, SOLUTION INTRAMUSCULAR; INTRAVENOUS; SUBCUTANEOUS ONCE
Status: COMPLETED | OUTPATIENT
Start: 2023-12-06 | End: 2023-12-06

## 2023-12-06 RX ORDER — LABETALOL HYDROCHLORIDE 5 MG/ML
5 INJECTION, SOLUTION INTRAVENOUS
Status: DISCONTINUED | OUTPATIENT
Start: 2023-12-06 | End: 2023-12-06 | Stop reason: HOSPADM

## 2023-12-06 RX ORDER — PHENAZOPYRIDINE HYDROCHLORIDE 100 MG/1
100 TABLET, FILM COATED ORAL 3 TIMES DAILY PRN
Qty: 6 TABLET | Refills: 0 | COMMUNITY
Start: 2023-12-06

## 2023-12-06 RX ORDER — HYDROMORPHONE HYDROCHLORIDE 1 MG/ML
1 INJECTION, SOLUTION INTRAMUSCULAR; INTRAVENOUS; SUBCUTANEOUS
Status: COMPLETED | OUTPATIENT
Start: 2023-12-06 | End: 2023-12-06

## 2023-12-06 RX ORDER — ONDANSETRON 2 MG/ML
4 INJECTION INTRAMUSCULAR; INTRAVENOUS ONCE
Status: COMPLETED | OUTPATIENT
Start: 2023-12-06 | End: 2023-12-06

## 2023-12-06 RX ORDER — DIPHENHYDRAMINE HYDROCHLORIDE 50 MG/ML
12.5 INJECTION INTRAMUSCULAR; INTRAVENOUS
Status: DISCONTINUED | OUTPATIENT
Start: 2023-12-06 | End: 2023-12-06 | Stop reason: HOSPADM

## 2023-12-06 RX ORDER — HALOPERIDOL 5 MG/ML
1 INJECTION INTRAMUSCULAR
Status: DISCONTINUED | OUTPATIENT
Start: 2023-12-06 | End: 2023-12-06 | Stop reason: HOSPADM

## 2023-12-06 RX ORDER — OXYCODONE HCL 5 MG/5 ML
5 SOLUTION, ORAL ORAL
Status: DISCONTINUED | OUTPATIENT
Start: 2023-12-06 | End: 2023-12-06 | Stop reason: HOSPADM

## 2023-12-06 RX ORDER — ROCURONIUM BROMIDE 10 MG/ML
INJECTION, SOLUTION INTRAVENOUS PRN
Status: DISCONTINUED | OUTPATIENT
Start: 2023-12-06 | End: 2023-12-06 | Stop reason: SURG

## 2023-12-06 RX ORDER — MEPERIDINE HYDROCHLORIDE 25 MG/ML
INJECTION INTRAMUSCULAR; INTRAVENOUS; SUBCUTANEOUS
Status: COMPLETED
Start: 2023-12-06 | End: 2023-12-06

## 2023-12-06 RX ORDER — KETOROLAC TROMETHAMINE 30 MG/ML
15 INJECTION, SOLUTION INTRAMUSCULAR; INTRAVENOUS ONCE
Status: COMPLETED | OUTPATIENT
Start: 2023-12-06 | End: 2023-12-06

## 2023-12-06 RX ORDER — DOCUSATE SODIUM 100 MG/1
100 CAPSULE, LIQUID FILLED ORAL 2 TIMES DAILY
Qty: 30 CAPSULE | Refills: 0 | COMMUNITY
Start: 2023-12-06

## 2023-12-06 RX ORDER — POLYETHYLENE GLYCOL 3350 17 G/17G
17 POWDER, FOR SOLUTION ORAL DAILY
Qty: 14 EACH | Refills: 0 | COMMUNITY
Start: 2023-12-06

## 2023-12-06 RX ORDER — EPHEDRINE SULFATE 50 MG/ML
5 INJECTION, SOLUTION INTRAVENOUS
Status: DISCONTINUED | OUTPATIENT
Start: 2023-12-06 | End: 2023-12-06 | Stop reason: HOSPADM

## 2023-12-06 RX ORDER — HYDROMORPHONE HYDROCHLORIDE 1 MG/ML
0.2 INJECTION, SOLUTION INTRAMUSCULAR; INTRAVENOUS; SUBCUTANEOUS
Status: DISCONTINUED | OUTPATIENT
Start: 2023-12-06 | End: 2023-12-06 | Stop reason: HOSPADM

## 2023-12-06 RX ORDER — OXYCODONE HCL 5 MG/5 ML
10 SOLUTION, ORAL ORAL
Status: DISCONTINUED | OUTPATIENT
Start: 2023-12-06 | End: 2023-12-06 | Stop reason: HOSPADM

## 2023-12-06 RX ORDER — IPRATROPIUM BROMIDE AND ALBUTEROL SULFATE 2.5; .5 MG/3ML; MG/3ML
3 SOLUTION RESPIRATORY (INHALATION)
Status: DISCONTINUED | OUTPATIENT
Start: 2023-12-06 | End: 2023-12-06 | Stop reason: HOSPADM

## 2023-12-06 RX ORDER — HYDROMORPHONE HYDROCHLORIDE 1 MG/ML
0.1 INJECTION, SOLUTION INTRAMUSCULAR; INTRAVENOUS; SUBCUTANEOUS
Status: DISCONTINUED | OUTPATIENT
Start: 2023-12-06 | End: 2023-12-06 | Stop reason: HOSPADM

## 2023-12-06 RX ORDER — SODIUM CHLORIDE 9 MG/ML
INJECTION, SOLUTION INTRAVENOUS CONTINUOUS
Status: DISCONTINUED | OUTPATIENT
Start: 2023-12-06 | End: 2023-12-06 | Stop reason: HOSPADM

## 2023-12-06 RX ORDER — TAMSULOSIN HYDROCHLORIDE 0.4 MG/1
0.4 CAPSULE ORAL DAILY
Qty: 30 CAPSULE | Refills: 1 | Status: SHIPPED | OUTPATIENT
Start: 2023-12-06

## 2023-12-06 RX ORDER — ONDANSETRON 2 MG/ML
INJECTION INTRAMUSCULAR; INTRAVENOUS PRN
Status: DISCONTINUED | OUTPATIENT
Start: 2023-12-06 | End: 2023-12-06 | Stop reason: SURG

## 2023-12-06 RX ORDER — SODIUM CHLORIDE, SODIUM LACTATE, POTASSIUM CHLORIDE, CALCIUM CHLORIDE 600; 310; 30; 20 MG/100ML; MG/100ML; MG/100ML; MG/100ML
INJECTION, SOLUTION INTRAVENOUS CONTINUOUS
Status: DISCONTINUED | OUTPATIENT
Start: 2023-12-06 | End: 2023-12-06 | Stop reason: HOSPADM

## 2023-12-06 RX ORDER — KETOROLAC TROMETHAMINE 10 MG/1
10 TABLET, FILM COATED ORAL EVERY 6 HOURS PRN
Qty: 12 TABLET | Refills: 0 | Status: SHIPPED | OUTPATIENT
Start: 2023-12-06 | End: 2023-12-09

## 2023-12-06 RX ORDER — OXYCODONE HYDROCHLORIDE 5 MG/1
5-10 TABLET ORAL EVERY 6 HOURS PRN
Qty: 15 TABLET | Refills: 0 | Status: SHIPPED | OUTPATIENT
Start: 2023-12-06 | End: 2023-12-11

## 2023-12-06 RX ORDER — DEXAMETHASONE SODIUM PHOSPHATE 4 MG/ML
INJECTION, SOLUTION INTRA-ARTICULAR; INTRALESIONAL; INTRAMUSCULAR; INTRAVENOUS; SOFT TISSUE PRN
Status: DISCONTINUED | OUTPATIENT
Start: 2023-12-06 | End: 2023-12-06 | Stop reason: SURG

## 2023-12-06 RX ORDER — HYDRALAZINE HYDROCHLORIDE 20 MG/ML
5 INJECTION INTRAMUSCULAR; INTRAVENOUS
Status: DISCONTINUED | OUTPATIENT
Start: 2023-12-06 | End: 2023-12-06 | Stop reason: HOSPADM

## 2023-12-06 RX ADMIN — SODIUM CHLORIDE: 9 INJECTION, SOLUTION INTRAVENOUS at 18:06

## 2023-12-06 RX ADMIN — SUGAMMADEX 200 MG: 100 INJECTION, SOLUTION INTRAVENOUS at 20:11

## 2023-12-06 RX ADMIN — HYDROMORPHONE HYDROCHLORIDE 1 MG: 1 INJECTION, SOLUTION INTRAMUSCULAR; INTRAVENOUS; SUBCUTANEOUS at 14:37

## 2023-12-06 RX ADMIN — IOHEXOL 100 ML: 350 INJECTION, SOLUTION INTRAVENOUS at 17:00

## 2023-12-06 RX ADMIN — ROCURONIUM BROMIDE 30 MG: 50 INJECTION, SOLUTION INTRAVENOUS at 19:54

## 2023-12-06 RX ADMIN — MEPERIDINE HYDROCHLORIDE 12.5 MG: 25 INJECTION INTRAMUSCULAR; INTRAVENOUS; SUBCUTANEOUS at 21:00

## 2023-12-06 RX ADMIN — HYDROMORPHONE HYDROCHLORIDE 1 MG: 1 INJECTION, SOLUTION INTRAMUSCULAR; INTRAVENOUS; SUBCUTANEOUS at 15:58

## 2023-12-06 RX ADMIN — ONDANSETRON 4 MG: 2 INJECTION INTRAMUSCULAR; INTRAVENOUS at 18:08

## 2023-12-06 RX ADMIN — KETOROLAC TROMETHAMINE 15 MG: 30 INJECTION, SOLUTION INTRAMUSCULAR; INTRAVENOUS at 17:15

## 2023-12-06 RX ADMIN — LIDOCAINE HYDROCHLORIDE 100 MG: 20 INJECTION, SOLUTION EPIDURAL; INFILTRATION; INTRACAUDAL at 19:27

## 2023-12-06 RX ADMIN — FENTANYL CITRATE 100 MCG: 50 INJECTION, SOLUTION INTRAMUSCULAR; INTRAVENOUS at 19:27

## 2023-12-06 RX ADMIN — DEXAMETHASONE SODIUM PHOSPHATE 4 MG: 4 INJECTION INTRA-ARTICULAR; INTRALESIONAL; INTRAMUSCULAR; INTRAVENOUS; SOFT TISSUE at 19:30

## 2023-12-06 RX ADMIN — ONDANSETRON 4 MG: 2 INJECTION INTRAMUSCULAR; INTRAVENOUS at 15:58

## 2023-12-06 RX ADMIN — SODIUM CHLORIDE, POTASSIUM CHLORIDE, SODIUM LACTATE AND CALCIUM CHLORIDE: 600; 310; 30; 20 INJECTION, SOLUTION INTRAVENOUS at 19:23

## 2023-12-06 RX ADMIN — ONDANSETRON 4 MG: 2 INJECTION INTRAMUSCULAR; INTRAVENOUS at 20:11

## 2023-12-06 RX ADMIN — HYDROMORPHONE HYDROCHLORIDE 1 MG: 1 INJECTION, SOLUTION INTRAMUSCULAR; INTRAVENOUS; SUBCUTANEOUS at 17:57

## 2023-12-06 RX ADMIN — PROPOFOL 200 MG: 10 INJECTION, EMULSION INTRAVENOUS at 19:27

## 2023-12-06 ASSESSMENT — ENCOUNTER SYMPTOMS
BLURRED VISION: 0
VOMITING: 1
CHILLS: 0
DEPRESSION: 0
BRUISES/BLEEDS EASILY: 0
FEVER: 0
NAUSEA: 1
SHORTNESS OF BREATH: 0
BACK PAIN: 1
ABDOMINAL PAIN: 1
FLANK PAIN: 1

## 2023-12-06 ASSESSMENT — PAIN DESCRIPTION - DESCRIPTORS: DESCRIPTORS: ACHING

## 2023-12-06 ASSESSMENT — FIBROSIS 4 INDEX: FIB4 SCORE: 1.4

## 2023-12-06 ASSESSMENT — PAIN SCALES - GENERAL: PAIN_LEVEL: 0

## 2023-12-06 NOTE — ED TRIAGE NOTES
"Chief Complaint   Patient presents with    Flank Pain     Pt states he was here yesterday and was diagnosed with a 8 mm kidney stone, pain 10/10  RLQ to right testicle     BP (!) 157/92   Pulse 89   Temp 36.2 °C (97.1 °F) (Temporal)   Resp 16   Ht 1.778 m (5' 10\")   Wt 98 kg (216 lb 0.8 oz)   SpO2 98%   BMI 31.00 kg/m²     "

## 2023-12-06 NOTE — ED PROVIDER NOTES
ER Provider Note    Scribed for Pedro Levy Ii, M.d. by Hattie Guerra. 12/6/2023  2:17 PM    Primary Care Provider: Maggie Garcia M.D.    CHIEF COMPLAINT  Chief Complaint   Patient presents with    Flank Pain     Pt states he was here yesterday and was diagnosed with a 8 mm kidney stone, pain 10/10  RLQ to right testicle     EXTERNAL RECORDS REVIEWED  Reviewed urology note from 12/4/2023.  Appointment was establish care for ureteral stone.  He started pain on 12/3/2023.  He had a CT scan at [facility] which showed a 6 x 6 x 8 mm proximal ureteral calculus with mild hydronephrosis and hydroureter.  He has started taking tamsulosin he has a previous stone.  Had previous treatment of kidney stone with lithotripsy by Dr. Morfin 7 years ago.  He was asymptomatic at clinic.    HPI/ROS  LIMITATION TO HISTORY   Select: : None  OUTSIDE HISTORIAN(S):  None    Benito Davis is a 63 y.o. male who presents to the ED complaining of flank pain onset a few days ago. The patient was diagnosed with a 8 mm kidney stone yesterday in the ED. Upon evaluation the patient is actively vomiting. The patient notes that he presented to the ED today for worsening abdominal and flank pain. He describes his pain as a 10/10 severity. The patient reports associated subjective fever, chills, RLQ abdominal pain, testicular pain. There are no known alleviating or exacerbating factors.  He notes that he has already had his appendix removed. He is allergic to codeine.    PAST MEDICAL HISTORY  Past Medical History:   Diagnosis Date    Fever     GERD (gastroesophageal reflux disease)     Heart burn     Hypertension     Indigestion     Kidney stone     Renal disorder     stones       SURGICAL HISTORY  Past Surgical History:   Procedure Laterality Date    CYSTOSCOPY  10/2/2015    Procedure: CYSTOSCOPY ;  Surgeon: Zaki Fitzgerald M.D.;  Location: SURGERY [facility];  Service:     URETEROSCOPY Right  "10/2/2015    Procedure: URETEROSCOPY;  Surgeon: Zaki Fitzgerald M.D.;  Location: SURGERY [facility];  Service:     LASERTRIPSY  10/2/2015    Procedure:  LASER LITHOTRIPSY;  Surgeon: Zaki Fitzgerald M.D.;  Location: SURGERY [facility];  Service:     STENT PLACEMENT Left 10/2/2015    Procedure: STENT PLACEMENT;  Surgeon: Zaki Fitzgerald M.D.;  Location: SURGERY [facility];  Service:     OTHER  2012    ganglion cyst removal Left wrist    OTHER ABDOMINAL SURGERY  1990    appendectomy       FAMILY HISTORY  Family History   Problem Relation Age of Onset    No Known Problems Mother     Heart Disease Father         Passed at 49 from MI    Heart Disease Step-Brother     Lung Disease Maternal Grandmother        SOCIAL HISTORY   reports that he quit smoking about 38 years ago. His smoking use included cigarettes. He started smoking about 42 years ago. He has a 2.0 pack-year smoking history. He has never used smokeless tobacco. He reports current alcohol use. He reports that he does not use drugs.    CURRENT MEDICATIONS  Previous Medications    CEPHALEXIN (KEFLEX) 500 MG CAP    Take 500 mg by mouth every 8 hours. X 5 days    LISINOPRIL (PRINIVIL) 20 MG TAB    Take 1 Tab by mouth every day.    MELATONIN 5 MG TAB    Take 5 mg by mouth every evening.    MORPHINE (MS IR) 15 MG TABLET    Take 0.5 Tablets by mouth every 6 hours as needed (breakthrough pain) for up to 12 days.    MULTIVITAMIN TAB    Take 1 Tablet by mouth every evening.    PROBIOTIC PRODUCT (PROBIOTIC PO)    Take 1 Tablet by mouth every evening.    SIMVASTATIN (ZOCOR) 40 MG TAB    Take 1 Tab by mouth every evening.    TAMSULOSIN (FLOMAX) 0.4 MG CAPSULE    Take 1 Capsule by mouth every day for 7 days.       ALLERGIES  Codeine    PHYSICAL EXAM  BP (!) 178/86   Pulse 79   Temp 36.2 °C (97.1 °F) (Temporal)   Resp 18   Ht 1.778 m (5' 10\")   Wt 98 kg (216 lb 0.8 oz)   SpO2 94%   BMI 31.00 kg/m²     Physical Exam  Vitals and nursing note reviewed. "   Constitutional:       Comments: 63-year-old man appears to be in significant pain.  Only right side of abdomen.   HENT:      Mouth/Throat:      Mouth: Mucous membranes are moist.   Eyes:      Extraocular Movements: Extraocular movements intact.      Pupils: Pupils are equal, round, and reactive to light.   Cardiovascular:      Rate and Rhythm: Normal rate and regular rhythm.   Abdominal:      General: There is no distension.      Tenderness: There is abdominal tenderness (right lower quadrant). There is guarding.   Musculoskeletal:         General: No swelling or tenderness. Normal range of motion.      Cervical back: Normal range of motion.   Neurological:      General: No focal deficit present.        DIAGNOSTIC STUDIES    Labs:   Labs Reviewed   CBC WITH DIFFERENTIAL - Abnormal; Notable for the following components:       Result Value    RBC 4.51 (*)     Hematocrit 40.0 (*)     Lymphocytes 16.10 (*)     Neutrophils (Absolute) 7.59 (*)     Monos (Absolute) 1.13 (*)     All other components within normal limits   COMP METABOLIC PANEL - Abnormal; Notable for the following components:    Glucose 143 (*)     Bun 23 (*)     All other components within normal limits   URINALYSIS - Abnormal; Notable for the following components:    Ketones 15 (*)     All other components within normal limits   ESTIMATED GFR     Radiology:     Radiologist interpretation:   CT-ABDOMEN-PELVIS WITH   Final Result      1.  8 mm right mid ureteral calculus causing mild right-sided hydronephrosis and hydroureter.      2.  Nonobstructing right lower pole of the left upper pole ureteral calculi. Minimal nonobstructing calculus in the right upper pole.      3.  Pelvic phleboli.      4.  Hepatic steatosis.      5.  3.4 cm exophytic hyperdense right renal cyst with central calcification consistent with Bosniak 2F   6.  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 When malignant, nearly all are indolent. Generally, Bosniak IIF  masses are followed by imaging at 6 months and 12 months, then annually for total of 5 years to assess for morphologic    change.      DX-CYSTO FLUORO > 1 HOUR    (Results Pending)      COURSE & MEDICAL DECISION MAKING     ED Observation Status? Yes; I am placing the patient in to an observation status due to a diagnostic uncertainty as well as therapeutic intensity. Patient placed in observation status at 2:21 PM, 12/6/2023.     Observation plan is as follows: Monitor for symptom management and diagnostic results.     Upon Reevaluation, the patient's condition has: not improved; and will be escalated to surgery.    Patient discharged from ED Observation status at 6:31 PM (Time) 12/6/2023 (Date).     INITIAL ASSESSMENT, COURSE AND PLAN  Care Narrative:     2:17 PM - Patient seen and examined at bedside. Discussed plan of care, including imaging and lab work. Patient agrees to the plan of care. The patient will be medicated with 1 mg Dilaudid. Ordered for CT Abdomen with, CBC with diff, CMP, and UA to evaluate his symptoms.      4:15 PM - Patient was reevaluated at bedside. The patient is still in pain and feeling nauseous. I ordered for 1 mg Dilaudid and 4 mg Zofran.     5:09 PM - Review of patient's imaging reveals that the patient has an obstructing ureteral stone. I paged urology.    5:27 PM - Spoke with Dr. Coronado, Urology, about the patient's condition. The patient will be placed in observation status and Dr. Coronado will add the patient onto the OR tonight to treat stone obstruction. If procedure cannot occur tonight he will be admitted. He is NPO and maintenance IV fluids will be started.      6:31 PM - The patient was brought up to the OR.    HYDRATION: Based on the patient's presentation of Acute Vomiting the patient was given IV fluids. IV Hydration was used because oral hydration was not adequate alone. Upon recheck following hydration, the patient was hydrated, prepared for their procedure.  HTN/IDDM  FOLLOW UP:  The patient has known hypertension and is being followed by their primary care doctor     PROBLEM LIST  #Obstructing right ureteral stone with hydronephrosis   -2 OR tonight with Dr. Coronado    #Hypertension   -Not at goal, he is compliant with medications this could be more situational pain related.   -No hypertensive emergency symptoms such as chest pain, trouble breathing, or strokelike symptoms.  No changes to medications will be made.    DISPOSITION AND DISCUSSIONS  I have discussed management of the patient with the following physicians and GURVINDER's:  Dr. Coronado (Urology)    Discussion of management with other [facility] or appropriate source(s): None     Barriers to care at this time, including but not limited to:  None .     FINAL DIAGNOSIS  1. Hydronephrosis with obstructing calculus    2. Ureteral stone Active        Hattie OWENS (Scribe), am scribing for, and in the presence of, EFREN Mckenna II.    Electronically signed by: Hattie Guerra (Scribe), 12/6/2023    Pedro OWENS II, M* personally performed the services described in this documentation, as scribed by Hattie Guerra in my presence, and it is both accurate and complete.      The note accurately reflects work and decisions made by me.  Pedro Levy II, M.D.  12/6/2023  9:44 PM

## 2023-12-07 NOTE — ANESTHESIA POSTPROCEDURE EVALUATION
Patient: Benito Davis    Procedure Summary       Date: 12/06/23 Room / Location: [place] [address] / SURGERY [facility]    Anesthesia Start: 1923 Anesthesia Stop: 2025    Procedures:       CYSTOSCOPY, WITH URETERAL STENT INSERTION, URETEROSCOPY (Right: Penis)      LITHOTRIPSY, USING LASER (Right: Penis) Diagnosis: (right ureteral stone)    Surgeons: Ankit Coronado M.D. Responsible Provider: Kenneth Flores M.D.    Anesthesia Type: general ASA Status: 2            Final Anesthesia Type: general  Last vitals  BP   Blood Pressure: 111/57    Temp   35.8 °C (96.5 °F)    Pulse   65   Resp   (!) 10    SpO2   97 %      Anesthesia Post Evaluation    Patient location during evaluation: PACU  Patient participation: complete - patient participated  Level of consciousness: sleepy but conscious  Pain score: 0    Airway patency: patent  Anesthetic complications: no  Cardiovascular status: hemodynamically stable  Respiratory status: acceptable  Hydration status: acceptable    PONV: none          No notable events documented.     Nurse Pain Score: 8 (NPRS)

## 2023-12-07 NOTE — PROGRESS NOTES
Pt awake and oriented X4, VSS, strings exposed, voided light pink urine X3. Discharge instructions provided and verbalized understanding.

## 2023-12-07 NOTE — ED NOTES
Pt medicated per MAR. Reconnected to cardiac monitor. States he's hot so door left open for him. Denies additional needs.

## 2023-12-07 NOTE — ED NOTES
Med Rec complete per patient   Allergies reviewed  Antibiotics in the past 30 days:yes  Anticoagulant in past 14 days:no  [facility] [address] [facility]

## 2023-12-07 NOTE — DISCHARGE INSTRUCTIONS
DR. LOZA' DISCHARGE INSTRUCTIONS FOLLOWING   URETEROSCOPY AND LASER LITHOTRIPSY      DIET:  You can resume your regular diet. We encourage you to eat well-balanced and nutritious meals.      ACTIVITY:  Please restrain from strenuous activity or heavy lifting (more than 20 pounds) for the next week.  Please walk daily as much as tolerated, making exercise a part of your daily life. Do not drive while using narcotics for pain control. You may resumes showering and bathing without any restrictions.     WOUND CARE:  1. You have no dressing or open wounds to manage.   2. You do have a internal ureteral stent on strings.  Please do not pull these strings as they will be used at your follow up visit to remove the stent.      MEDICATIONS:  - Please use an over the counter antiinflammatory (ie Ibuprofen, naproxen, Ketoralac) and/or Tylenol for pain control as needed.  - You may take 3 days of pyridium as prescribed for numbing the bladder and burning with urination.  - If you have severe pain refractory to Ibuprofen you may use Oxycodone for pain control as well as prescribed. If taking a narcotic, please use a stool softener like miralax or colace to prevent constipation.  - Please use flomax daily as prescribed while you have a stent in place to lessen stone pain.   - If you are using aspirin, Plavix, or coumadin, please don't restart these medications until 1 day after your discharge if you are not having large amounts of blood in the urine.    FOLLOW-UP:  We will call you to schedule your follow up appointment in 7-10 days with Dr. Loza' physician assistant for stent removal on strings and discussion of stone diet. You will have f/u with  in approximately 8 weeks with renal ultrasound and possibly metabolic work up if you've had recurrent stones. If you have not heard from us in 1-2 business days, please call [phone] to schedule your follow-up appointment. You may also contact this number if you have  questions or concerns that can be answered by Dr. Coronado' staff.      WARNING SIGNS:  Fever greater than 101 degrees Fahrenheit, chills, nausea or vomiting, Large amount of clots in urine that make it difficult to urinate or for urine to drain from rooney, increasing pain, or abdominal swelling. If you are experiencing these symptoms, call the Urology Clinic or go to your local PCP or emergency room.    It is normal to see blood in your urine for up to 2 weeks even from surgery. The urine may clear up entirely, and then turn bloody again a few days later depending on your activity level; do not be alarmed. However, if you experience severe pain or tenderness, have a lot of increased bleeding, or find that you are unable to urinate because of large clots, please notify your doctor immediately     MEDICAL HELP DURING NORMAL BUSINESS HOURS:  Between the hours of 8 AM and 5 PM, please call [phone] to speak with Dr. Ankit Coronado’ staff.     MEDICAL HELP AFTER HOURS:  If you have a serious emergency such as chest pain, shortness of breath, relentless pain you should call 911. For other urgent problems after hours you may contact the urology physician on call by phoning the [phone]. You may also visit the Emergency room at local hospital for help.     For non-emergent problems such as prescription refills or routine questions, please do your best to contact us during normal business hours. This after-hours number should be used for urgent or emergent questions only.         Ankit Coronado M.D.   [address] [place] [address]   [phone]          If any questions arise, call your provider.  If your provider is not available, please feel free to call the [facility] at [phone].    MEDICATIONS: Resume taking daily medication.  Take prescribed pain medication with food.  If no medication is prescribed, you may take non-aspirin pain medication if needed.  PAIN MEDICATION CAN BE VERY CONSTIPATING.   Take a stool softener or laxative such as senokot, pericolace, or milk of magnesia if needed.    Last pain medication given at ________________

## 2023-12-07 NOTE — CONSULTS
[facility] Consult/H&P Note    Patient's Name/MRN: Benito Davis, [id number]   Room #: TPREPOOL/NONE    Admit Date:12/6/2023  Today's Date: 12/6/2023   Length of stay:  LOS: 0 days      Reason for consult/chief complaint: Right flank pain  ID/HPI: Benito Davis is a 63 y.o. male patient who p/w severe 9/10 flank pain on right side. He has had +N, +V, -F, -C.  This is his 2nd episode of stones. Has had prior stone procedures in past.  In ER, WBC was nml, UA was not c/w infection, and Cr was 1.14 up.  CT was doen showing 8mm stone in the R proximal ureter and 6mm R LP stone.      Past Medical History:   Past Medical History:   Diagnosis Date    Fever     GERD (gastroesophageal reflux disease)     Heart burn     Hypertension     Indigestion     Kidney stone     Renal disorder     stones        Past Surgical History:   Past Surgical History:   Procedure Laterality Date    CYSTOSCOPY  10/2/2015    Procedure: CYSTOSCOPY ;  Surgeon: Zaki Fitzgerald M.D.;  Location: SURGERY [facility];  Service:     URETEROSCOPY Right 10/2/2015    Procedure: URETEROSCOPY;  Surgeon: Zaki Fitzgerald M.D.;  Location: [facility];  Service:     LASERTRIPSY  10/2/2015    Procedure:  LASER LITHOTRIPSY;  Surgeon: Zaki Fitzgerald M.D.;  Location: [facility];  Service:     STENT PLACEMENT Left 10/2/2015    Procedure: STENT PLACEMENT;  Surgeon: Zaki Fitzgerald M.D.;  Location: SURGERY [facility];  Service:     OTHER  2012    ganglion cyst removal Left wrist    OTHER ABDOMINAL SURGERY  1990    appendectomy        Family History:   Family History   Problem Relation Age of Onset    No Known Problems Mother     Heart Disease Father         Passed at 49 from MI    Heart Disease Step-Brother     Lung Disease Maternal Grandmother          Social History:   Social History     Tobacco Use    Smoking status: Former     Current packs/day: 0.00     Average packs/day: 0.5 packs/day for 4.0 years (2.0 ttl pk-yrs)      Types: Cigarettes     Start date: 10/20/1981     Quit date: 10/20/1985     Years since quittin.1    Smokeless tobacco: Never   Vaping Use    Vaping Use: Never used   Substance Use Topics    Alcohol use: Yes     Comment: 3 a day    Drug use: No     Types: Marijuana      Social History     Social History Narrative    Not on file        Allergies: he Codeine    Medications:   Medications Prior to Admission   Medication Sig Dispense Refill Last Dose    Probiotic Product (PROBIOTIC PO) Take 1 Tablet by mouth every evening.   2023 at PM    multivitamin Tab Take 1 Tablet by mouth every evening.   2023 at PM    melatonin 5 mg Tab Take 5 mg by mouth every evening.   2023 at PM    cephALEXin (KEFLEX) 500 MG Cap Take 500 mg by mouth every 8 hours. X 5 days   2023 at AM    morphine (MS IR) 15 MG tablet Take 0.5 Tablets by mouth every 6 hours as needed (breakthroug hpain) for up to 12 days. 6 Tablet 0 NEW RX at NOT STARTED    tamsulosin (FLOMAX) 0.4 MG capsule Take 1 Capsule by mouth every day for 7 days. 7 Capsule 0 2023 at AM    lisinopril (PRINIVIL) 20 MG Tab Take 1 Tab by mouth every day. 90 Tab 0 2023 at AM    simvastatin (ZOCOR) 40 MG Tab Take 1 Tab by mouth every evening. 90 Tab 3 2023 at PM         Review of Systems  Review of Systems   Constitutional:  Negative for chills and fever.   Eyes:  Negative for blurred vision.   Respiratory:  Negative for shortness of breath.    Cardiovascular:  Negative for chest pain.   Gastrointestinal:  Positive for abdominal pain, nausea and vomiting.   Genitourinary:  Positive for flank pain, frequency and urgency. Negative for dysuria and hematuria.        R testicular pain   Musculoskeletal:  Positive for back pain.   Skin:  Negative for rash.   Endo/Heme/Allergies:  Does not bruise/bleed easily.   Psychiatric/Behavioral:  Negative for depression.         Physical Exam  VITAL SIGNS: BP (!) 158/77   Pulse 79   Temp 36.2 °C (97.1 °F) (Temporal)   " Resp 12   Ht 1.778 m (5' 10\")   Wt 98 kg (216 lb 0.8 oz)   SpO2 97%   BMI 31.00 kg/m²   GENERAL:  alert, in no acute distress  EYES: EOMI and normal accomodation  Neck: Supple  BACK: VCAT+  CHEST AND LUNGS: good air entry, no audible wheezes  CARDIOVASCULAR: Rate is regular, no peripheral edema.   ABDOMEN: Abdomen soft, nontender. No masses, organomegaly.  : + R CVAT  EXTREMITIES: Warm and well perfused without c/c/e  NEURO: No focal deficits  SKIN: Skin color, texture, turgor normal. No rashes, lesions, nor jaundice.      Labs:  Recent Labs     12/05/23  1236 12/06/23  1405   WBC 12.8* 10.5   RBC 4.61* 4.51*   HEMOGLOBIN 14.9 14.4   HEMATOCRIT 41.7* 40.0*   MCV 90.5 88.7   MCH 32.3 31.9   MCHC 35.7 36.0   RDW 38.8 38.1   PLATELETCT 231 223   MPV 8.9* 9.1     Recent Labs     12/05/23  1236 12/06/23  1405   SODIUM 138 139   POTASSIUM 4.2 3.8   CHLORIDE 101 104   CO2 22 22   GLUCOSE 161* 143*   BUN 19 23*   CREATININE 1.14 1.14   CALCIUM 9.1 8.8     Recent Labs     12/05/23  1236   INR 1.06     Glucose:  Lab Results   Component Value Date/Time    GLUCOSE 143 (H) 12/06/2023 02:05 PM    GLUCOSE 161 (H) 12/05/2023 12:36 PM    GLUCOSE 99 11/29/2018 03:23 AM    GLUCOSE 124 (H) 11/28/2018 06:42 PM     Coags:  Lab Results   Component Value Date/Time    INR 1.06 12/05/2023 12:36 PM    INR 1.00 02/04/2019 04:17 PM    INR 1.05 11/28/2018 07:35 PM         Urinalysis:   Lab Results   Component Value Date/Time    COLORURINE Yellow 12/06/2023 03:55 PM    CLARITY Clear 12/06/2023 03:55 PM    SPECGRAVITY 1.022 12/06/2023 03:55 PM    PHURINE 5.0 12/06/2023 03:55 PM    GLUCOSEUR Negative 12/06/2023 03:55 PM    KETONES 15 (A) 12/06/2023 03:55 PM    NITRITE Negative 12/06/2023 03:55 PM    OCCULTBLOOD Negative 12/06/2023 03:55 PM    RBCURINE 2-5 (A) 09/25/2015 12:43 PM    EPITHELCELL Few 09/25/2015 12:43 PM       Imaging:                    Results for orders placed during the hospital encounter of 11/18/19    CT-CHEST (THORAX) " W/O    Impression  1.  Significant improvement in left lower lobe opacification with residual scarring/atelectasis. Mild atelectasis or scarring in the left upper lobe.    2.  Stable fissural nodules along the right major fissure measuring up to 4 mm.    3.  Atherosclerosis.     Results for orders placed during the hospital encounter of 11/28/18    CT-CHEST (THORAX) WITH    Impression  Bilateral pulmonary nodules and ill-defined nodular opacities. Some of these have surrounding groundglass opacity. A pleural-based 1.5 mm nodule in the left upper lobe exhibits mild cavitation. Findings may be infectious, related to noninfective  granulomatous disease, with malignancy not excluded.    Borderline prominent mediastinal lymph nodes are nonspecific.    Atherosclerotic plaque including coronary artery calcification.    Bilateral nonobstructing renal calculi.    Air-fluid levels in the colon can be seen in the setting of diarrhea.                   Results for orders placed during the hospital encounter of 12/06/23    CT-ABDOMEN-PELVIS WITH    Impression  1.  8 mm right mid ureteral calculus causing mild right-sided hydronephrosis and hydroureter.    2.  Nonobstructing right lower pole of the left upper pole ureteral calculi. Minimal nonobstructing calculus in the right upper pole.    3.  Pelvic phleboli.    4.  Hepatic steatosis.    5.  3.4 cm exophytic hyperdense right renal cyst with central calcification consistent with Bosniak 2F  6.  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 When malignant, nearly all are indolent. Generally, Bosniak IIF masses are followed by imaging at 6 months and 12 months, then annually for total of 5 years to assess for morphologic  change.                   Assessment/Recommendation   63 y.o.M with right 8mm ureteral stone.  He understands the risk of inability to access ureter, the need for second procedures, the possibility of negative ureteroscopy, that he may have  stent discomfort until this is removed, bleeding, infection, ureteral injury or stricture, bladder injury, post op urinary retention requiring rooney catheter, and the general pulmonary and cardiovascular risks associated with anesthesia.  After a full discussion of the alternatives risks and benefits of the procedure, the patient consented to proceeding with the planned procedure.     Consent obatined  Add on for Cystoscopy, right ureteroscopy, Laser lithotripsy and JJ stents (CULTS)       Ankit Coronado M.D.   [address] [place] [address]   [phone]

## 2023-12-07 NOTE — OP REPORT
Urologic Surgery Operative Report     Date of Procedure: 12/6/2023    Pre-op Diagnosis: Right nephrolithiasis  Right ureterolithiasis  Right hydronephrosis  Right renal colick   Post-op Diagnosis: Same as above   Procedure: - Cystoscopy, Right Ureteroscopy w/ laser lithotripsy, with ureteral stent:, 84074   - Right pyeloscopy with laser lithotripsy of stone in separate organ system,  [id number](-CS if medicare)    Surgeon: Surgeon(s) and Role:     * Ankit Coronado M.D. - Primary   Assistant: Circulator: Bryant Munguia R.N.  Laser Staff: John Diana  Scrub Person: Nitin Lamar   Anesthesia: General  No anesthesia staff entered.   Estimated Blood Loss: Minimal   IV fluids <1L Crystalloid    Specimens: 1. Right Stone for chemical analysis    Complications: None   Condition: Stable, procedure well tolerated    Drains: 1. Right [id number], JJ stent(on strings)  2. No rooney   Disposition:  1. PACU, discharge after voiding  2. f/u 7-10 days stent removal on strings   Findings 1.  0.8 cm stone at right mid to proximal ureter which was tested within the ureter.  Small fragment of this was also present in the kidney.  The rest of this was lasered in upper pole calyx and talus and small very passable fragments.  2.  There is additionally a mid to upper pole 0.6 cm stone which was a Derrell's plaque which was nicely dusted again popcorn into only small passable fragments similar to the other stone.      Indication:   63-year-old male with prior history nephrolithiasis who presents with severe right-sided flank pain with several ER visits now for known 8 mm ureteral stone which has now progressed to the mid ureter however with refractory pain back in the ER again tonight..  After a full discussion of alternatives, risks, and benefits the patient consented to proceeding with Ureteroscopy, laser lithotripsy and possible JJ stent placement on the respective side.  He understands the risk of inability to access ureter, the  need for second procedures, the possibility of negative ureteroscopy, that he may have stent discomfort until this is removed, bleeding, infection, ureteral injury or stricture, bladder injury, post op urinary retention requiring rooney catheter, and the general pulmonary and cardiovascular risks associated with anesthesia.     Procedure Details:   The patient was taken to operating room and placed on table in supine position.  Ancef was administered prior to the start of the procedure based on previous urine cultures. Sequential compression devices were placed for deep venous thrombosis prophylaxis. After induction of general anesthesia, both legs were placed in Prateek stirrups in the standard lithotomy position.  A timeout was held confirming the correct patient, procedure and laterality.   The perineal area was prepped and draped in a sterile fashion. A 22 [place] rigid cystoscope was inserted into the urethra and the bladder was emptied and then distended with sterile saline. Urethral sounds were not needed to dilate the urethral meatus. Cystoscopy revealed normal bladder mucosa, orthotopic ureteral orifices, and no evidence of cystitis or stones within the bladder.    We passed a wire through the ureteral orifice of the respective side into the renal pelvis which was visualized under fluoroscopic vision. The wire was moved to the side and a semirigid ureteroscope was placed into the urethra and passed up the right ureter. We did not need a ureteral dilator to pass the scope into the ureter.  This was passed up into the mid proximal ureter where the initial 0.8 cm stone was found.  The holmium laser was used on low energy high-frequency settings to dust the stone into small passable fragments.  Eventually a main portion of stone washed up into the upper pole.    At this point the semirigid ureteroscope was removed and moved onto flexible ureteroscopy.  .We inserted the disposable digital flexible ureteroscope and  surveyed the kidney finding the main fragment was washed in the upper pole.  There is also a 6 mm Derrell's plaque type stone in the mid to upper pole calyx that was separate.. The holmium laser was then used to fracture first the stone in the upper pole using dusting and then eventually popcorn settings.  In similar fashion we additionally ingested the second stone in the midpole calyx..  We removed the ureteroscope slowly with the ureteral access sheath flushing out any remaining ureteral stone debris.     Returnign to rigid cystoscope, we then placed a right-sided JJ stent, [id number], JJ stent (on strings) under fluoroscopy. We then saw that the JJ stent was in appropriate position, with a good curl visualized in the renal pelvis fluoroscopically and a good curl in the bladder endoscopically. The cystoscope was removed after emptying the bladder.  The patient was awoken from anesthesia and brought to recovery in satisfactory condition.          Ankit Coronado M.D.   [address] [place] [address]   [phone]

## 2023-12-07 NOTE — ED NOTES
ERP messaged. Pt states back in pain and is now dry heaving.     ERP responded, new orders placed.

## 2023-12-07 NOTE — ED NOTES
Report given to Preop RN. States they are coming for patient now. Pt currently up voiding bladder, asked to removed clothes and get into bed for transport.

## 2023-12-07 NOTE — ANESTHESIA PROCEDURE NOTES
Airway    Date/Time: 12/6/2023 7:29 PM    Performed by: Kenneth Flores M.D.  Authorized by: Kenneth Flores M.D.    Location:  OR  Urgency:  Elective  Indications for Airway Management:  Anesthesia      Spontaneous Ventilation: absent    Sedation Level:  Deep  Preoxygenated: Yes    Mask Difficulty Assessment:  1 - vent by mask  Final Airway Type:  Supraglottic airway  Final Supraglottic Airway:  Standard LMA    SGA Size:  4  Number of Attempts at Approach:  1

## 2023-12-07 NOTE — ANESTHESIA TIME REPORT
Anesthesia Start and Stop Event Times       Date Time Event    12/6/2023 1908 Ready for Procedure     1923 Anesthesia Start     2025 Anesthesia Stop          Responsible Staff  12/06/23      Name Role Begin End    Kenneth Flores M.D. Anesth 1923 2025          Overtime Reason:  no overtime (within assigned shift)    Comments:

## 2023-12-10 LAB
BACTERIA BLD CULT: NORMAL
SIGNIFICANT IND 70042: NORMAL
SITE SITE: NORMAL
SOURCE SOURCE: NORMAL

## 2025-07-18 ENCOUNTER — APPOINTMENT (OUTPATIENT)
Dept: URBAN - METROPOLITAN AREA CLINIC 4 | Facility: CLINIC | Age: 65
Setting detail: DERMATOLOGY
End: 2025-07-18

## 2025-07-18 DIAGNOSIS — Z71.89 OTHER SPECIFIED COUNSELING: ICD-10-CM

## 2025-07-18 DIAGNOSIS — D22 MELANOCYTIC NEVI: ICD-10-CM

## 2025-07-18 DIAGNOSIS — D18.0 HEMANGIOMA: ICD-10-CM

## 2025-07-18 DIAGNOSIS — B36.0 PITYRIASIS VERSICOLOR: ICD-10-CM

## 2025-07-18 DIAGNOSIS — D485 NEOPLASM OF UNCERTAIN BEHAVIOR OF SKIN: ICD-10-CM

## 2025-07-18 DIAGNOSIS — L81.4 OTHER MELANIN HYPERPIGMENTATION: ICD-10-CM

## 2025-07-18 DIAGNOSIS — L82.1 OTHER SEBORRHEIC KERATOSIS: ICD-10-CM

## 2025-07-18 PROBLEM — D48.5 NEOPLASM OF UNCERTAIN BEHAVIOR OF SKIN: Status: ACTIVE | Noted: 2025-07-18

## 2025-07-18 PROBLEM — D22.39 MELANOCYTIC NEVI OF OTHER PARTS OF FACE: Status: ACTIVE | Noted: 2025-07-18

## 2025-07-18 PROBLEM — D18.01 HEMANGIOMA OF SKIN AND SUBCUTANEOUS TISSUE: Status: ACTIVE | Noted: 2025-07-18

## 2025-07-18 PROBLEM — D22.5 MELANOCYTIC NEVI OF TRUNK: Status: ACTIVE | Noted: 2025-07-18

## 2025-07-18 PROCEDURE — ? COUNSELING

## 2025-07-18 PROCEDURE — ? DIAGNOSIS COMMENT

## 2025-07-18 PROCEDURE — ? PHOTO-DOCUMENTATION

## 2025-07-18 PROCEDURE — ? BIOPSY BY SHAVE METHOD

## 2025-07-18 ASSESSMENT — LOCATION DETAILED DESCRIPTION DERM
LOCATION DETAILED: LEFT CENTRAL MALAR CHEEK
LOCATION DETAILED: RIGHT CENTRAL MALAR CHEEK
LOCATION DETAILED: RIGHT SUPERIOR UPPER BACK
LOCATION DETAILED: LEFT LATERAL SUPERIOR CHEST
LOCATION DETAILED: RIGHT MEDIAL UPPER BACK
LOCATION DETAILED: NASAL SUPRATIP
LOCATION DETAILED: RIGHT SUPERIOR MEDIAL UPPER BACK
LOCATION DETAILED: LEFT MEDIAL SUPERIOR CHEST
LOCATION DETAILED: UPPER STERNUM

## 2025-07-18 ASSESSMENT — LOCATION SIMPLE DESCRIPTION DERM
LOCATION SIMPLE: NOSE
LOCATION SIMPLE: RIGHT UPPER BACK
LOCATION SIMPLE: CHEST
LOCATION SIMPLE: LEFT CHEEK
LOCATION SIMPLE: RIGHT CHEEK

## 2025-07-18 ASSESSMENT — LOCATION ZONE DERM
LOCATION ZONE: TRUNK
LOCATION ZONE: FACE
LOCATION ZONE: NOSE

## 2025-07-18 NOTE — PROCEDURE: COUNSELING
Detail Level: Zone
Detail Level: Generalized
Sunscreen Recommendations: I recommend a zinc or titanium based sunscreen for daily wear.  I recommend 30 spf  or greater.
Detail Level: Detailed
Pt A&Ox4 c/o intermittent L sided chest discomfort x 2 weeks. Denies cardiac hx n/v dizziness. Airway patent, respirations even and unlabored.

## (undated) DEVICE — GLOVE SZ 6 BIOGEL PI MICRO - PF LF (50PR/BX 4BX/CA)

## (undated) DEVICE — CANISTER SUCTION 3000ML MECHANICAL FILTER AUTO SHUTOFF MEDI-VAC NONSTERILE LF DISP  (40EA/CA)

## (undated) DEVICE — CONTAINER SPECIMEN BAG OR - STERILE 4 OZ W/LID (100EA/CA)

## (undated) DEVICE — GLOVE BIOGEL SZ 7.5 SURGICAL PF LTX - (50PR/BX 4BX/CA)

## (undated) DEVICE — GATEWAY SIDE ARM ADAPTER (10/BX)

## (undated) DEVICE — SCOPE DIGITAL URETEROSCOPE DISPOSABLE (10EA/PK)

## (undated) DEVICE — SPONGE GAUZESTER 4 X 4 4PLY - (128PK/CA)

## (undated) DEVICE — PACK CYSTO III (2EA/CA)

## (undated) DEVICE — COVER FOOT UNIVERSAL DISP. - (25EA/CA)

## (undated) DEVICE — SET LEADWIRE 5 LEAD BEDSIDE DISPOSABLE ECG (1SET OF 5/EA)

## (undated) DEVICE — SLEEVE, VASO, THIGH, MED

## (undated) DEVICE — SODIUM CHL. IRRIGATION 0.9% 3000ML (4EA/CA 65CA/PF)

## (undated) DEVICE — WATER IRRIG. STER 3000 ML - (4/CA)

## (undated) DEVICE — SENSOR OXIMETER ADULT SPO2 RD SET (20EA/BX)

## (undated) DEVICE — WIRE GUIDE SENSOR DUAL FLEX - 5/BX

## (undated) DEVICE — GLOVE BIOGEL PI INDICATOR SZ 6.5 SURGICAL PF LF - (50/BX 4BX/CA)

## (undated) DEVICE — FIBER LASER MOSES 200 UM (1/EA)

## (undated) DEVICE — GOWN SURGEONS X-LARGE - DISP. (30/CA)

## (undated) DEVICE — TUBING CLEARLINK DUO-VENT - C-FLO (48EA/CA)

## (undated) DEVICE — LACTATED RINGERS INJ 1000 ML - (14EA/CA 60CA/PF)

## (undated) DEVICE — SLEEVE VASO CALF MED - (10PR/CA)

## (undated) DEVICE — SET EXTENSION WITH 2 PORTS (48EA/CA) ***PART #2C8610 IS A SUBSTITUTE*****

## (undated) DEVICE — SUCTION INSTRUMENT YANKAUER BULBOUS TIP W/O VENT (50EA/CA)

## (undated) DEVICE — CONNECTOR HOSE NEPTUNE FOR CYSTO ROOM

## (undated) DEVICE — GOWN WARMING STANDARD FLEX - (30/CA)

## (undated) DEVICE — BAG URODRAIN WITH TUBING - (20/CA)

## (undated) DEVICE — GLOVE BIOGEL PI INDICATOR SZ 7.5 SURGICAL PF LF -(50/BX 4BX/CA)